# Patient Record
Sex: MALE | Race: WHITE | NOT HISPANIC OR LATINO | Employment: FULL TIME | URBAN - METROPOLITAN AREA
[De-identification: names, ages, dates, MRNs, and addresses within clinical notes are randomized per-mention and may not be internally consistent; named-entity substitution may affect disease eponyms.]

---

## 2017-02-06 ENCOUNTER — ALLSCRIPTS OFFICE VISIT (OUTPATIENT)
Dept: OTHER | Facility: OTHER | Age: 47
End: 2017-02-06

## 2018-01-15 NOTE — PROGRESS NOTES
Assessment    1  Current every day smoker (305 1) (F17 200)   2  Encounter for preventive health examination (V70 0) (Z00 00)   3  Prostate cancer screening (V76 44) (Z12 5)   4  Screening for cardiovascular, respiratory, and genitourinary diseases   (V81 2,V81 4,V81 6) (Z13 6,Z13 83,Z13 89)   5  Screening for diabetes mellitus (DM) (V77 1) (Z13 1)   6  GERD (gastroesophageal reflux disease) (530 81) (K21 9)   7  Mild intermittent asthma (493 90) (J45 20)    Plan  GERD (gastroesophageal reflux disease)    · RaNITidine HCl - 150 MG Oral Tablet; TAKE 1 TABLET TWICE DAILY  prn   · 1 - Linda Varela MD, Liliam Velazuqez (Gastroenterology) Physician Referral  Consult Only: the  expectation is that the referring provider will communicate back to the patient on  treatment options  Evaluation and Treatment: the expectation is that the referred to  provider will communicate back to the patient on treatment options  consider EGD   Status: Active  Requested for: 77OXW3660  Care Summary provided  : Yes  Health Maintenance, GERD (gastroesophageal reflux disease), Prostate cancer  screening, Screening for cardiovascular, respiratory, and genitourinary diseases,  Screening for diabetes mellitus (DM)    · (1) CBC/PLT/DIFF; Status:Active; Requested for:56Rso5334;    · (1) COMPREHENSIVE METABOLIC PANEL; Status:Active; Requested for:40Lum9495;    · (1) LIPID PANEL FASTING W DIRECT LDL REFLEX; Status:Active; Requested  for:34Ksn3860;    · (1) PSA (SCREEN) (Dx V76 44 Screen for Prostate Cancer); Status:Active; Requested  for:61Zoe6826;    · (1) TSH; Status:Active;  Requested for:88Ynl0303;   Mild intermittent asthma    · Ventolin  (90 Base) MCG/ACT Inhalation Aerosol Solution; 2 puffs q4hrs  prn, swish/spit after use  SocHx: Current every day smoker    · Follow Up if Not Better Evaluation and Treatment  Follow-up  Status: Complete  Done:  58JXL2506   · Shared Decision Making Aid given; Status:Complete;   Done: 78NTQ2557   · You need to quit smoking ; Status:Complete;   Done: 25PXK6383    Discussion/Summary    You can be offered an Adacel shot (10 year tetanus plus pertussis) in the future if you have not had one (dTaP)  The patient was counseled regarding risk factor reductions, impressions, risks and benefits of treatment options  Chief Complaint  NP present for CPE  ac/cma      History of Present Illness  HM, Adult Male: The patient is being seen for a health maintenance evaluation  General Health: The patient's health since the last visit is described as good  Immunizations status: not up to date   unknown  Lifestyle:  He consumes a diverse and healthy diet  He does not have any weight concerns  He exercises regularly  He uses tobacco    Screening:   HPI: daily GERD for years, no EGD in past, takes omeprazole    late night meals cut back  lost some wt  gym 3-4x/wk  occas pepcid/zantac  bowels and urine normal  twin children    asthma as child, some season triggers and uri  not persistent  ventolin prn  occas anxiety at work/kids  irritable at times  not ready for meds at this time         Review of Systems    Cardiovascular: no chest pain  Respiratory: wheezing and sometimes as stated above, but no shortness of breath  Psychiatric: anxiety, but no depression  Active Problems    1  Colon cancer screening (V76 51) (Z12 11)   2  Immunization due (V05 9) (Z23)   3  Prostate cancer screening (V76 44) (Z12 5)   4  Screening for cardiovascular, respiratory, and genitourinary diseases   (V81 2,V81 4,V81 6) (Z13 6,Z13 83,Z13 89)   5  Screening for diabetes mellitus (DM) (V77 1) (Z13 1)    Family History  Father    · Family history of diabetes mellitus (V18 0) (Z83 3)    Social History    · Current every day smoker (305 1) (F17 200)   · Occasional alcohol use    Current Meds   1  Omeprazole 20 MG Oral Tablet Delayed Release; TAKE 1 TABLET  AS NEEDED   Recorded    Allergies    1  Amoxicillin TABS   2   Penicillins    Vitals   Recorded: 44FNR1472 04:30PM   Temperature 96 5 F   Heart Rate 64   Respiration 20   Systolic 161   Diastolic 70   Height 5 ft 9 9 in   Weight 207 lb    BMI Calculated 29 78   BSA Calculated 2 12     Physical Exam    Constitutional   General appearance: No acute distress, well appearing and well nourished  Eyes   Conjunctiva and lids: No erythema, swelling or discharge  Pupils and irises: Equal, round, reactive to light  Ears, Nose, Mouth, and Throat   External inspection of ears and nose: Normal     Otoscopic examination: Tympanic membranes translucent with normal light reflex  Canals patent without erythema  Nasal mucosa, septum, and turbinates: Normal without edema or erythema  Lips, teeth, and gums: Normal, good dentition  Oropharynx: Normal with no erythema, edema, exudate or lesions  Neck   Neck: Supple, symmetric, trachea midline, no masses  Thyroid: Normal, no thyromegaly  Pulmonary   Respiratory effort: No increased work of breathing or signs of respiratory distress  Auscultation of lungs: Clear to auscultation  Cardiovascular   Auscultation of heart: Normal rate and rhythm, normal S1 and S2, no murmurs  Carotid pulses: 2+ bilaterally  Pedal pulses: 2+ bilaterally  Examination of extremities for edema and/or varicosities: Normal     Chest   Palpation of breasts and axillae: Normal, no masses palpated  Chest: Normal     Abdomen   Abdomen: Non-tender, no masses  Liver and spleen: No hepatomegaly or splenomegaly  Examination for hernias: No hernias appreciated  Genitourinary   Scrotal contents: Normal testes, no masses  Penis: Normal, no lesions  Digital rectal exam of prostate: Normal size, no masses  Lymphatic   Palpation of lymph nodes in neck: No lymphadenopathy  Palpation of lymph nodes in groin: No lymphadenopathy  Musculoskeletal   Gait and station: Normal     Inspection/palpation of digits and nails: Normal without clubbing or cyanosis  Inspection/palpation of joints, bones, and muscles: Normal     Skin   Skin and subcutaneous tissue: Normal without rashes or lesions  Palpation of skin and subcutaneous tissue: Normal turgor  Neurologic   Reflexes: 2+ and symmetric  Sensation: No sensory loss  Psychiatric   Mood and affect: Normal        Procedure    Procedure: Visual Acuity Test    Indication: routine screening  Inforrmation supplied by a Snellen chart  Results: 20/15 in both eyes with corrective device, 20/20 in the right eye with corrective device, 20/20 in the left eye with corrective device      Provider Comments  Provider Comments:   consider EGD for chronic GERD  Recent data with increased risk of ischemic CVA with PPI use advised    could offer adacel at nurse visit if needed      Signatures   Electronically signed by : Erik Villeda DO; Feb 6 2017  5:25PM EST                       (Author)

## 2018-01-22 VITALS
HEIGHT: 70 IN | TEMPERATURE: 96.5 F | WEIGHT: 207 LBS | DIASTOLIC BLOOD PRESSURE: 70 MMHG | HEART RATE: 64 BPM | RESPIRATION RATE: 20 BRPM | BODY MASS INDEX: 29.63 KG/M2 | SYSTOLIC BLOOD PRESSURE: 110 MMHG

## 2021-05-15 PROBLEM — K21.9 GERD WITHOUT ESOPHAGITIS: Status: ACTIVE | Noted: 2017-02-06

## 2021-05-15 PROBLEM — F17.200 TOBACCO USE DISORDER: Status: ACTIVE | Noted: 2018-07-16

## 2021-05-15 PROBLEM — F41.1 GAD (GENERALIZED ANXIETY DISORDER): Status: ACTIVE | Noted: 2018-07-16

## 2021-05-15 PROBLEM — J45.20 MILD INTERMITTENT ASTHMA: Status: ACTIVE | Noted: 2017-02-06

## 2021-05-15 PROBLEM — E66.9 OBESITY (BMI 30-39.9): Status: ACTIVE | Noted: 2018-03-14

## 2021-05-15 PROBLEM — E78.2 MIXED HYPERLIPIDEMIA: Status: ACTIVE | Noted: 2018-08-27

## 2021-05-15 RX ORDER — ALBUTEROL SULFATE 90 UG/1
AEROSOL, METERED RESPIRATORY (INHALATION)
COMMUNITY
Start: 2017-02-06 | End: 2021-05-18 | Stop reason: SDUPTHER

## 2021-05-18 ENCOUNTER — OFFICE VISIT (OUTPATIENT)
Dept: FAMILY MEDICINE CLINIC | Facility: CLINIC | Age: 51
End: 2021-05-18
Payer: COMMERCIAL

## 2021-05-18 VITALS
RESPIRATION RATE: 16 BRPM | DIASTOLIC BLOOD PRESSURE: 70 MMHG | BODY MASS INDEX: 31.78 KG/M2 | HEIGHT: 70 IN | OXYGEN SATURATION: 94 % | HEART RATE: 96 BPM | SYSTOLIC BLOOD PRESSURE: 138 MMHG | TEMPERATURE: 97.9 F | WEIGHT: 222 LBS

## 2021-05-18 DIAGNOSIS — Z13.83 SCREENING FOR CARDIOVASCULAR, RESPIRATORY, AND GENITOURINARY DISEASES: ICD-10-CM

## 2021-05-18 DIAGNOSIS — F41.1 GAD (GENERALIZED ANXIETY DISORDER): ICD-10-CM

## 2021-05-18 DIAGNOSIS — J45.20 MILD INTERMITTENT ASTHMA, UNSPECIFIED WHETHER COMPLICATED: ICD-10-CM

## 2021-05-18 DIAGNOSIS — Z12.5 PROSTATE CANCER SCREENING: ICD-10-CM

## 2021-05-18 DIAGNOSIS — F17.200 TOBACCO USE DISORDER: ICD-10-CM

## 2021-05-18 DIAGNOSIS — Z13.1 SCREENING FOR DIABETES MELLITUS (DM): ICD-10-CM

## 2021-05-18 DIAGNOSIS — K21.9 GERD WITHOUT ESOPHAGITIS: Primary | ICD-10-CM

## 2021-05-18 DIAGNOSIS — Z13.6 SCREENING FOR CARDIOVASCULAR, RESPIRATORY, AND GENITOURINARY DISEASES: ICD-10-CM

## 2021-05-18 DIAGNOSIS — E78.2 MIXED HYPERLIPIDEMIA: ICD-10-CM

## 2021-05-18 DIAGNOSIS — E66.9 OBESITY (BMI 30-39.9): ICD-10-CM

## 2021-05-18 DIAGNOSIS — Z13.89 SCREENING FOR CARDIOVASCULAR, RESPIRATORY, AND GENITOURINARY DISEASES: ICD-10-CM

## 2021-05-18 DIAGNOSIS — R19.4 BOWEL HABIT CHANGES: ICD-10-CM

## 2021-05-18 PROCEDURE — 99204 OFFICE O/P NEW MOD 45 MIN: CPT | Performed by: FAMILY MEDICINE

## 2021-05-18 RX ORDER — ALBUTEROL SULFATE 90 UG/1
2 AEROSOL, METERED RESPIRATORY (INHALATION) EVERY 6 HOURS PRN
Qty: 18 G | Refills: 3 | Status: SHIPPED | OUTPATIENT
Start: 2021-05-18 | End: 2021-05-18

## 2021-05-18 RX ORDER — BUPROPION HYDROCHLORIDE 150 MG/1
150 TABLET ORAL DAILY
Qty: 90 TABLET | Refills: 1 | Status: SHIPPED | OUTPATIENT
Start: 2021-05-18 | End: 2021-06-22 | Stop reason: SDUPTHER

## 2021-05-18 RX ORDER — ALPRAZOLAM 0.5 MG/1
TABLET ORAL
COMMUNITY
End: 2021-05-18

## 2021-05-18 RX ORDER — ALBUTEROL SULFATE 90 UG/1
AEROSOL, METERED RESPIRATORY (INHALATION)
Qty: 54 G | Refills: 1 | Status: SHIPPED | OUTPATIENT
Start: 2021-05-18

## 2021-05-18 RX ORDER — BUPROPION HYDROCHLORIDE 150 MG/1
150 TABLET ORAL DAILY
COMMUNITY
End: 2021-05-18 | Stop reason: SDUPTHER

## 2021-05-18 NOTE — PROGRESS NOTES
Assessment/Plan:    No problem-specific Assessment & Plan notes found for this encounter  Hx of CPE at [facility] <1y ago    Intermittent asthma, SANIA refilled, f/u if uses frequently    Tobacco use, discouraged, trying patch, cannot use chantix due to mood disorder aware    NIMA, persistent, did well on wellbutrin when taken, will restart, other options if not effective, no hx of SSRI recalled, agrees to avoid BZD, wife agrees    High trig hx  Off meds for a while  Reassess  May be lovaza candidate or low dose statin due to myalgias     Diagnoses and all orders for this visit:    GERD without esophagitis  -     Ambulatory referral to Gastroenterology; Future    Tobacco use disorder    NIMA (generalized anxiety disorder)  -     buPROPion (WELLBUTRIN XL) 150 mg 24 hr tablet; Take 1 tablet (150 mg total) by mouth daily    Mild intermittent asthma, unspecified whether complicated  -     Discontinue: albuterol (Ventolin HFA) 90 mcg/act inhaler; Inhale 2 puffs every 6 (six) hours as needed for wheezing or shortness of breath Rinse/spit after use    Mixed hyperlipidemia    Obesity (BMI 30-39  9)    Bowel habit changes  -     Ambulatory referral to Gastroenterology; Future    Screening for cardiovascular, respiratory, and genitourinary diseases  -     Lipid Panel with Direct LDL reflex; Future  -     Lipid Panel with Direct LDL reflex    Screening for diabetes mellitus (DM)  -     Comprehensive metabolic panel; Future  -     Comprehensive metabolic panel    Prostate cancer screening  -     PSA, Total Screen; Future    Other orders  -     Discontinue: albuterol (Ventolin HFA) 90 mcg/act inhaler; Inhale  -     esomeprazole (NexIUM) 20 mg capsule; Take 20 mg by mouth  -     Discontinue: ALPRAZolam (XANAX) 0 5 mg tablet; Take by mouth daily at bedtime as needed for anxiety  -     Discontinue: buPROPion (WELLBUTRIN XL) 150 mg 24 hr tablet;  Take 150 mg by mouth daily              Return in about 1 month (around 6/18/2021) for Recheck  Subjective:      Patient ID: Radha Bernstein is a 52 y o  male  Chief Complaint   Patient presents with    New Patient Visit     [address]  New patient  Was at [facility]  Dx with NIMA, high chol,   wellbutrin for tob use in past then mood    Ran out of insurance  Still smoking   wellbutrin worked on 150mg qd  No other ssri  Some generic xanax  Felt ok on wellbutrin    Fenofibrate taken  Muscle aches on statin  Tries low fat diet  Not been on meds for a while      Exercises 3-4x/w    bzd risks aware    Last labs 1y ago  No meds in past year    Has gerd  Never had an egd  Frequent symptoms  On otc nexium 20mg/d  Has bowel habit changes also wife reports    Asthma on/off  Since child  Intermittent type    The following portions of the patient's history were reviewed and updated as appropriate: allergies, current medications, past family history, past medical history, past social history, past surgical history and problem list     Review of Systems   Constitutional: Negative for chills and fever  HENT: Negative for sore throat and trouble swallowing  Eyes: Negative for discharge and redness  Respiratory: Negative for shortness of breath  Cardiovascular: Negative for chest pain  Gastrointestinal: Negative for abdominal pain and blood in stool  Genitourinary: Negative for dysuria and hematuria  Musculoskeletal: Negative for arthralgias and myalgias  Skin: Negative for pallor and rash  Neurological: Negative for seizures  Psychiatric/Behavioral: Negative for dysphoric mood  The patient is nervous/anxious            Current Outpatient Medications   Medication Sig Dispense Refill    buPROPion (WELLBUTRIN XL) 150 mg 24 hr tablet Take 1 tablet (150 mg total) by mouth daily 90 tablet 1    esomeprazole (NexIUM) 20 mg capsule Take 20 mg by mouth      albuterol (PROVENTIL HFA,VENTOLIN HFA) 90 mcg/act inhaler INHALE 2 PUFFS BY MOUTH EVERY 6 HOURS AS NEEDED FOR WHEEZING OR SHORTNESS OF BREATH  RINSE AND SPIT AFTER USE 54 g 1     No current facility-administered medications for this visit  Objective:    /70   Pulse 96   Temp 97 9 °F (36 6 °C)   Resp 16   Ht 5' 10" (1 778 m)   Wt 101 kg (222 lb)   SpO2 94%   BMI 31 85 kg/m²        Physical Exam  Vitals signs and nursing note reviewed  Constitutional:       General: He is not in acute distress  Appearance: He is well-developed  He is not ill-appearing  HENT:      Head: Normocephalic  Right Ear: Tympanic membrane and external ear normal  There is no impacted cerumen  Left Ear: Tympanic membrane and external ear normal  There is no impacted cerumen  Nose: No congestion  Mouth/Throat:      Mouth: Mucous membranes are moist       Pharynx: Oropharynx is clear  No oropharyngeal exudate  Eyes:      General: No scleral icterus  Conjunctiva/sclera: Conjunctivae normal    Neck:      Musculoskeletal: Neck supple  Cardiovascular:      Rate and Rhythm: Normal rate and regular rhythm  Heart sounds: No murmur  Pulmonary:      Effort: Pulmonary effort is normal  No respiratory distress  Breath sounds: No wheezing  Abdominal:      General: There is no distension  Palpations: Abdomen is soft  There is no mass  Tenderness: There is no abdominal tenderness  Hernia: A hernia is present  Hernia is present in the umbilical area  Genitourinary:     Penis: Normal        Scrotum/Testes: Normal       Prostate: Normal       Rectum: Normal    Musculoskeletal:         General: No deformity  Right lower leg: No edema  Left lower leg: No edema  Skin:     General: Skin is warm and dry  Coloration: Skin is not jaundiced or pale  Neurological:      Mental Status: He is alert  Motor: No weakness  Gait: Gait normal    Psychiatric:         Behavior: Behavior normal          Thought Content: Thought content normal        BMI Counseling:  Body mass index is 31 85 kg/m²  The BMI is above normal  Nutrition recommendations include decreasing portion sizes and moderation in carbohydrate intake  Exercise recommendations include exercising 3-5 times per week  No pharmacotherapy was ordered         bmi not accurate due to muscle mass         Gonzalo Sherwood, DO

## 2021-06-22 ENCOUNTER — OFFICE VISIT (OUTPATIENT)
Dept: FAMILY MEDICINE CLINIC | Facility: CLINIC | Age: 51
End: 2021-06-22
Payer: COMMERCIAL

## 2021-06-22 VITALS
RESPIRATION RATE: 16 BRPM | SYSTOLIC BLOOD PRESSURE: 134 MMHG | WEIGHT: 225 LBS | TEMPERATURE: 97.6 F | BODY MASS INDEX: 32.21 KG/M2 | HEIGHT: 70 IN | HEART RATE: 88 BPM | DIASTOLIC BLOOD PRESSURE: 70 MMHG

## 2021-06-22 DIAGNOSIS — F17.200 TOBACCO USE DISORDER: ICD-10-CM

## 2021-06-22 DIAGNOSIS — E78.2 MIXED HYPERLIPIDEMIA: ICD-10-CM

## 2021-06-22 DIAGNOSIS — F41.1 GAD (GENERALIZED ANXIETY DISORDER): Primary | ICD-10-CM

## 2021-06-22 PROCEDURE — 99214 OFFICE O/P EST MOD 30 MIN: CPT | Performed by: FAMILY MEDICINE

## 2021-06-22 RX ORDER — BUPROPION HYDROCHLORIDE 300 MG/1
300 TABLET ORAL DAILY
Qty: 90 TABLET | Refills: 1 | Status: SHIPPED | OUTPATIENT
Start: 2021-06-22 | End: 2021-12-18

## 2021-06-22 NOTE — PATIENT INSTRUCTIONS
If your triglycerides are over 500, then low fat diet is recommended and we can initiate a medication called Lovaza or Vascepa and monitor you every 6 months

## 2021-06-22 NOTE — PROGRESS NOTES
Assessment/Plan:    No problem-specific Assessment & Plan notes found for this encounter  NIMA better but not fully, increase wellbutrin to 300mg and f/u in 1m if no better but q6m if finds effective    tob use discouraged, try to stop while on wellbutrin    Lipids discussed, try low fat diet    bmi aware, diet suggested and exercise    If trig over 500, discussed starting lovaza or vascepa then f/u afterwards     Diagnoses and all orders for this visit:    NIMA (generalized anxiety disorder)  -     buPROPion (WELLBUTRIN XL) 300 mg 24 hr tablet; Take 1 tablet (300 mg total) by mouth daily    Tobacco use disorder    Mixed hyperlipidemia        Return in about 6 months (around 12/22/2021) for Recheck  Subjective:      Patient ID: Jose May is a 54 y o  male  Chief Complaint   Patient presents with    Follow-up     [facility] lpn       HPI   in [address] [place] [address] smoking about 1/2 ppd, less than before, less am craving    Stress smoker  No time    Did not do labs yet  No probs sleeping    Willing to use higher dose    The following portions of the patient's history were reviewed and updated as appropriate: allergies, current medications, past family history, past medical history, past social history, past surgical history and problem list     Review of Systems   Respiratory: Negative for shortness of breath  Neurological: Negative for seizures  Current Outpatient Medications   Medication Sig Dispense Refill    albuterol (PROVENTIL HFA,VENTOLIN HFA) 90 mcg/act inhaler INHALE 2 PUFFS BY MOUTH EVERY 6 HOURS AS NEEDED FOR WHEEZING OR SHORTNESS OF BREATH  RINSE AND SPIT AFTER USE 54 g 1    buPROPion (WELLBUTRIN XL) 300 mg 24 hr tablet Take 1 tablet (300 mg total) by mouth daily 90 tablet 1    esomeprazole (NexIUM) 20 mg capsule Take 20 mg by mouth       No current facility-administered medications for this visit         Objective:    /70   Pulse 88   Temp 97 6 °F (36 4 °C)   Resp 16  5' 10" (1 778 m)   Wt 102 kg (225 lb)   BMI 32 28 kg/m²        Physical Exam  Vitals and nursing note reviewed  Constitutional:       Appearance: He is well-developed  He is obese  He is not diaphoretic  HENT:      Head: Normocephalic  Right Ear: Tympanic membrane normal       Left Ear: Tympanic membrane normal    Eyes:      General: No scleral icterus  Conjunctiva/sclera: Conjunctivae normal    Cardiovascular:      Rate and Rhythm: Normal rate and regular rhythm  Heart sounds: No murmur heard  Pulmonary:      Effort: Pulmonary effort is normal  No respiratory distress  Abdominal:      General: There is no distension  Palpations: Abdomen is soft  Tenderness: There is no abdominal tenderness  Musculoskeletal:         General: No deformity  Cervical back: Neck supple  Skin:     General: Skin is warm and dry  Coloration: Skin is not pale  Neurological:      Mental Status: He is alert  Gait: Gait normal    Psychiatric:         Behavior: Behavior normal          Thought Content:  Thought content normal                 Diana Stein DO

## 2021-07-13 LAB
ALBUMIN SERPL-MCNC: 4.2 G/DL (ref 4–5)
ALBUMIN/GLOB SERPL: 1.4 {RATIO} (ref 1.2–2.2)
ALP SERPL-CCNC: 80 IU/L (ref 48–121)
ALT SERPL-CCNC: 18 IU/L (ref 0–44)
AST SERPL-CCNC: 22 IU/L (ref 0–40)
BILIRUB SERPL-MCNC: 0.2 MG/DL (ref 0–1.2)
BUN SERPL-MCNC: 14 MG/DL (ref 6–24)
BUN/CREAT SERPL: 14 (ref 9–20)
CALCIUM SERPL-MCNC: 9.4 MG/DL (ref 8.7–10.2)
CHLORIDE SERPL-SCNC: 102 MMOL/L (ref 96–106)
CHOLEST SERPL-MCNC: 230 MG/DL (ref 100–199)
CO2 SERPL-SCNC: 23 MMOL/L (ref 20–29)
CREAT SERPL-MCNC: 0.97 MG/DL (ref 0.76–1.27)
GLOBULIN SER-MCNC: 3.1 G/DL (ref 1.5–4.5)
GLUCOSE SERPL-MCNC: 110 MG/DL (ref 65–99)
HDLC SERPL-MCNC: 33 MG/DL
LDLC SERPL CALC-MCNC: 108 MG/DL (ref 0–99)
LDLC/HDLC SERPL: 3.3 RATIO (ref 0–3.6)
MICRODELETION SYND BLD/T FISH: NORMAL
POTASSIUM SERPL-SCNC: 4.2 MMOL/L (ref 3.5–5.2)
PROT SERPL-MCNC: 7.3 G/DL (ref 6–8.5)
PSA SERPL-MCNC: 1 NG/ML (ref 0–4)
SL AMB EGFR AFRICAN AMERICAN: 106 ML/MIN/1.73
SL AMB EGFR NON AFRICAN AMERICAN: 91 ML/MIN/1.73
SL AMB VLDL CHOLESTEROL CALC: 89 MG/DL (ref 5–40)
SODIUM SERPL-SCNC: 138 MMOL/L (ref 134–144)
TRIGL SERPL-MCNC: 513 MG/DL (ref 0–149)

## 2021-07-15 ENCOUNTER — OFFICE VISIT (OUTPATIENT)
Dept: GASTROENTEROLOGY | Facility: CLINIC | Age: 51
End: 2021-07-15
Payer: COMMERCIAL

## 2021-07-15 VITALS
HEIGHT: 70 IN | DIASTOLIC BLOOD PRESSURE: 93 MMHG | SYSTOLIC BLOOD PRESSURE: 167 MMHG | HEART RATE: 75 BPM | WEIGHT: 226.2 LBS | BODY MASS INDEX: 32.38 KG/M2

## 2021-07-15 DIAGNOSIS — K21.9 GERD WITHOUT ESOPHAGITIS: ICD-10-CM

## 2021-07-15 DIAGNOSIS — R19.7 DIARRHEA, UNSPECIFIED TYPE: Primary | ICD-10-CM

## 2021-07-15 DIAGNOSIS — R19.4 BOWEL HABIT CHANGES: ICD-10-CM

## 2021-07-15 PROCEDURE — 99204 OFFICE O/P NEW MOD 45 MIN: CPT | Performed by: INTERNAL MEDICINE

## 2021-07-15 NOTE — PROGRESS NOTES
[facility] - Outpatient Consultation  Debra Gaspar 54 y o  male MRN: [id number]  Encounter: [id number]          ASSESSMENT AND PLAN:      1  GERD without esophagitis   patient has history of epigastric discomfort and heartburn  He has been taking over-the-counter omeprazole 20 mg a day  He that relieves his symptoms pretty much  Recently he has noted food getting up into his throat  He also is having epigastric discomfort and pain after eating  His eating habits are very erratic  He has a shaft  He eats late at night  He goes to bed  He has had occasions when he has woken up with acid and food in his mouth  He denies any dysphagia or odynophagia  He denies any weight loss or weight gain  There is no nausea or vomiting  He denies any other systemic symptoms at this time  - Ambulatory referral to Gastroenterology  - EGD; Future    2  Bowel habit changes    Patient is complaining of change in his bowel habits loose stools  He has not seen any blood in it  There is no family history of colon cancer  He does not have any other systemic symptoms associated  With this change in bowel habits  There is no family history of colon cancer  A colonoscopy will be scheduled for evaluation  He never had a screening colonoscopy  - Ambulatory referral to Gastroenterology  - Colonoscopy; Future    3  Diarrhea, unspecified type    See above  There has been no rectal bleeding or mucus per rectum  - Colonoscopy; Future    ______________________________________________________________________    HPI:    Epigastric pain and discomfort  Reflux  Heartburn  Regurgitation of food and acid into the throat  Change in bowel habits with diarrhea  There is no rectal bleeding  REVIEW OF SYSTEMS:    CONSTITUTIONAL: Denies any fever, chills, rigors, and weight loss  HEENT: No earache or tinnitus  Denies hearing loss or visual disturbances    CARDIOVASCULAR: No chest pain or palpitations  RESPIRATORY: Denies any cough, hemoptysis, shortness of breath or dyspnea on exertion  GASTROINTESTINAL: As noted in the History of Present Illness  GENITOURINARY: No problems with urination  Denies any hematuria or dysuria  NEUROLOGIC: No dizziness or vertigo, denies headaches  MUSCULOSKELETAL: Denies any muscle or joint pain  SKIN: Denies skin rashes or itching  ENDOCRINE: Denies excessive thirst  Denies intolerance to heat or cold  PSYCHOSOCIAL: Denies depression or anxiety  Denies any recent memory loss  Historical Information   History reviewed  No pertinent past medical history  History reviewed  No pertinent surgical history  Social History   Social History     Substance and Sexual Activity   Alcohol Use Not Currently     Social History     Substance and Sexual Activity   Drug Use Never     Social History     Tobacco Use   Smoking Status Current Every Day Smoker    Packs/day: 0 25    Types: Cigarettes   Smokeless Tobacco Never Used     History reviewed  No pertinent family history  Meds/Allergies       Current Outpatient Medications:     albuterol (PROVENTIL HFA,VENTOLIN HFA) 90 mcg/act inhaler    buPROPion (WELLBUTRIN XL) 300 mg 24 hr tablet    esomeprazole (NexIUM) 20 mg capsule    Allergies   Allergen Reactions    Penicillins GI Intolerance and Seizures           Objective     Blood pressure 167/93, pulse 75, height 5' 10" (1 778 m), weight 103 kg (226 lb 3 2 oz)  Body mass index is 32 46 kg/m²  PHYSICAL EXAM:      General Appearance:   Alert, cooperative, no distress   HEENT:   Normocephalic, atraumatic, anicteric      Neck:  Supple, symmetrical, trachea midline   Lungs:   Clear to auscultation bilaterally; no rales, rhonchi or wheezing; respirations unlabored    Heart[de-identified]   Regular rate and rhythm; no murmur, rub, or gallop     Abdomen:   Soft, non-tender, non-distended; normal bowel sounds; no masses, no organomegaly   Small umbilical hernia noted Genitalia:   Deferred    Rectal:   Deferred    Extremities:  No cyanosis, clubbing or edema    Pulses:  2+ and symmetric    Skin:  No jaundice, rashes, or lesions    Lymph nodes:  No palpable cervical lymphadenopathy        Lab Results:   No visits with results within 1 Day(s) from this visit  Latest known visit with results is:   Office Visit on 05/18/2021   Component Date Value    Glucose, Random 07/12/2021 110*    BUN 07/12/2021 14     Creatinine 07/12/2021 0 97     eGFR Non  07/12/2021 91     eGFR  07/12/2021 106     SL AMB BUN/CREATININE RA* 07/12/2021 14     Sodium 07/12/2021 138     Potassium 07/12/2021 4 2     Chloride 07/12/2021 102     CO2 07/12/2021 23     CALCIUM 07/12/2021 9 4     Protein, Total 07/12/2021 7 3     Albumin 07/12/2021 4 2     Globulin, Total 07/12/2021 3 1     Albumin/Globulin Ratio 07/12/2021 1 4     TOTAL BILIRUBIN 07/12/2021 0 2     Alk Phos Isoenzymes 07/12/2021 80     AST 07/12/2021 22     ALT 07/12/2021 18     Cholesterol, Total 07/12/2021 230*    Triglycerides 07/12/2021 513*    HDL 07/12/2021 33*    VLDL Cholesterol Calcula* 07/12/2021 89*    LDL Calculated 07/12/2021 108*    LDl/HDL Ratio 07/12/2021 3 3     Prostate Specific Antige* 07/12/2021 1 0     Interpretation 07/12/2021 Note          Radiology Results:   No results found

## 2021-07-20 ENCOUNTER — TELEPHONE (OUTPATIENT)
Dept: FAMILY MEDICINE CLINIC | Facility: CLINIC | Age: 51
End: 2021-07-20

## 2021-07-20 DIAGNOSIS — E78.1 HIGH TRIGLYCERIDES: Primary | ICD-10-CM

## 2021-07-20 DIAGNOSIS — E78.1 HIGH TRIGLYCERIDES: ICD-10-CM

## 2021-07-20 RX ORDER — OMEGA-3-ACID ETHYL ESTERS 1 G/1
2 CAPSULE, LIQUID FILLED ORAL 2 TIMES DAILY
Qty: 120 CAPSULE | Refills: 5 | Status: SHIPPED | OUTPATIENT
Start: 2021-07-20 | End: 2021-07-21

## 2021-07-20 NOTE — TELEPHONE ENCOUNTER
Patient called and stated that at his last office visit he and Dr Herber Carnes discussed a FishOil to take if his triglycerides showed that they were high in his blood work  He states that they were high    Please advise       Mike Moritz [place]

## 2021-07-21 RX ORDER — OMEGA-3-ACID ETHYL ESTERS 1 G/1
CAPSULE, LIQUID FILLED ORAL
Qty: 360 CAPSULE | Refills: 1 | Status: SHIPPED | OUTPATIENT
Start: 2021-07-21 | End: 2022-03-30

## 2021-07-21 NOTE — TELEPHONE ENCOUNTER
Please let him know I sent a prescription and he should follow up in office if he has any problems with it before his next scheduled visit in December

## 2021-08-18 ENCOUNTER — TELEPHONE (OUTPATIENT)
Dept: PREADMISSION TESTING | Facility: HOSPITAL | Age: 51
End: 2021-08-18

## 2021-08-18 VITALS — BODY MASS INDEX: 32.35 KG/M2 | WEIGHT: 226 LBS | HEIGHT: 70 IN

## 2021-08-18 NOTE — PRE-PROCEDURE INSTRUCTIONS
Pre-Surgery Instructions:   Medication Instructions    albuterol (PROVENTIL HFA,VENTOLIN HFA) 90 mcg/act inhaler Pt may use if needed    bisacodyl (DULCOLAX) 5 mg EC tablet Patient was instructed by Physician and understands   buPROPion (WELLBUTRIN XL) 300 mg 24 hr tablet Instructed patient per Anesthesia Guidelines   esomeprazole (NexIUM) 20 mg capsule Instructed patient per Anesthesia Guidelines   magnesium citrate solution Patient was instructed by Physician and understands   omega-3-acid ethyl esters (LOVAZA) 1 g capsule Patient was instructed by Physician and understands   Polyethylene Glycol 3350 (MIRALAX PO) Patient was instructed by Physician and understands  Pt to follow Dr Castro Long instructions  Pt to have covid screening on 8/19/21 at the [facility] [employer] [phone]

## 2021-08-23 ENCOUNTER — TELEPHONE (OUTPATIENT)
Dept: GASTROENTEROLOGY | Facility: CLINIC | Age: 51
End: 2021-08-23

## 2021-10-27 ENCOUNTER — TELEPHONE (OUTPATIENT)
Dept: PREADMISSION TESTING | Facility: HOSPITAL | Age: 51
End: 2021-10-27

## 2021-10-27 RX ORDER — OMEPRAZOLE 20 MG/1
20 CAPSULE, DELAYED RELEASE ORAL DAILY
COMMUNITY

## 2021-11-02 ENCOUNTER — PREP FOR PROCEDURE (OUTPATIENT)
Dept: GASTROENTEROLOGY | Facility: CLINIC | Age: 51
End: 2021-11-02

## 2021-11-02 DIAGNOSIS — R19.4 BOWEL HABIT CHANGES: Primary | ICD-10-CM

## 2021-11-02 DIAGNOSIS — K21.9 GASTROESOPHAGEAL REFLUX DISEASE WITHOUT ESOPHAGITIS: ICD-10-CM

## 2021-11-02 DIAGNOSIS — R19.7 DIARRHEA, UNSPECIFIED TYPE: ICD-10-CM

## 2021-12-10 DIAGNOSIS — F41.1 GAD (GENERALIZED ANXIETY DISORDER): ICD-10-CM

## 2021-12-10 RX ORDER — BUPROPION HYDROCHLORIDE 300 MG/1
300 TABLET ORAL EVERY MORNING
Qty: 30 TABLET | Refills: 0 | Status: SHIPPED | OUTPATIENT
Start: 2021-12-10 | End: 2021-12-20 | Stop reason: SDUPTHER

## 2021-12-13 ENCOUNTER — RA CDI HCC (OUTPATIENT)
Dept: OTHER | Facility: HOSPITAL | Age: 51
End: 2021-12-13

## 2021-12-20 ENCOUNTER — OFFICE VISIT (OUTPATIENT)
Dept: FAMILY MEDICINE CLINIC | Facility: CLINIC | Age: 51
End: 2021-12-20
Payer: COMMERCIAL

## 2021-12-20 VITALS
HEIGHT: 70 IN | BODY MASS INDEX: 34.19 KG/M2 | DIASTOLIC BLOOD PRESSURE: 78 MMHG | HEART RATE: 88 BPM | RESPIRATION RATE: 17 BRPM | WEIGHT: 238.8 LBS | TEMPERATURE: 98.2 F | SYSTOLIC BLOOD PRESSURE: 127 MMHG

## 2021-12-20 DIAGNOSIS — Z13.6 SCREENING FOR CARDIOVASCULAR, RESPIRATORY, AND GENITOURINARY DISEASES: ICD-10-CM

## 2021-12-20 DIAGNOSIS — F41.1 GAD (GENERALIZED ANXIETY DISORDER): ICD-10-CM

## 2021-12-20 DIAGNOSIS — Z12.5 PROSTATE CANCER SCREENING: ICD-10-CM

## 2021-12-20 DIAGNOSIS — Z13.83 SCREENING FOR CARDIOVASCULAR, RESPIRATORY, AND GENITOURINARY DISEASES: ICD-10-CM

## 2021-12-20 DIAGNOSIS — Z23 IMMUNIZATION DUE: ICD-10-CM

## 2021-12-20 DIAGNOSIS — Z13.1 SCREENING FOR DIABETES MELLITUS (DM): ICD-10-CM

## 2021-12-20 DIAGNOSIS — Z13.89 SCREENING FOR CARDIOVASCULAR, RESPIRATORY, AND GENITOURINARY DISEASES: ICD-10-CM

## 2021-12-20 DIAGNOSIS — E66.9 OBESITY (BMI 30-39.9): Primary | ICD-10-CM

## 2021-12-20 DIAGNOSIS — R19.4 BOWEL HABIT CHANGES: ICD-10-CM

## 2021-12-20 DIAGNOSIS — F17.200 TOBACCO USE DISORDER: ICD-10-CM

## 2021-12-20 DIAGNOSIS — E78.2 MIXED HYPERLIPIDEMIA: ICD-10-CM

## 2021-12-20 PROCEDURE — 99214 OFFICE O/P EST MOD 30 MIN: CPT | Performed by: FAMILY MEDICINE

## 2021-12-20 PROCEDURE — 3725F SCREEN DEPRESSION PERFORMED: CPT | Performed by: FAMILY MEDICINE

## 2021-12-20 PROCEDURE — 90471 IMMUNIZATION ADMIN: CPT

## 2021-12-20 PROCEDURE — 90686 IIV4 VACC NO PRSV 0.5 ML IM: CPT

## 2021-12-20 PROCEDURE — 3008F BODY MASS INDEX DOCD: CPT | Performed by: FAMILY MEDICINE

## 2021-12-20 RX ORDER — BUPROPION HYDROCHLORIDE 300 MG/1
300 TABLET ORAL EVERY MORNING
Qty: 90 TABLET | Refills: 0 | Status: SHIPPED | OUTPATIENT
Start: 2021-12-20 | End: 2022-02-02 | Stop reason: ALTCHOICE

## 2021-12-20 RX ORDER — NALTREXONE HYDROCHLORIDE AND BUPROPION HYDROCHLORIDE 8; 90 MG/1; MG/1
2 TABLET, EXTENDED RELEASE ORAL 2 TIMES DAILY
Qty: 120 TABLET | Refills: 1 | Status: SHIPPED | OUTPATIENT
Start: 2021-12-20 | End: 2022-03-02 | Stop reason: SDUPTHER

## 2021-12-20 RX ORDER — NALTREXONE HYDROCHLORIDE AND BUPROPION HYDROCHLORIDE 8; 90 MG/1; MG/1
TABLET, EXTENDED RELEASE ORAL
Qty: 70 TABLET | Refills: 0 | Status: SHIPPED | OUTPATIENT
Start: 2021-12-20 | End: 2022-01-26

## 2021-12-22 LAB
ALBUMIN SERPL-MCNC: 4 G/DL (ref 4–5)
ALBUMIN/GLOB SERPL: 1.4 {RATIO} (ref 1.2–2.2)
ALP SERPL-CCNC: 71 IU/L (ref 44–121)
ALT SERPL-CCNC: 19 IU/L (ref 0–44)
AST SERPL-CCNC: 22 IU/L (ref 0–40)
BILIRUB SERPL-MCNC: <0.2 MG/DL (ref 0–1.2)
BUN SERPL-MCNC: 8 MG/DL (ref 6–24)
BUN/CREAT SERPL: 10 (ref 9–20)
CALCIUM SERPL-MCNC: 9.1 MG/DL (ref 8.7–10.2)
CHLORIDE SERPL-SCNC: 102 MMOL/L (ref 96–106)
CHOLEST SERPL-MCNC: 185 MG/DL (ref 100–199)
CO2 SERPL-SCNC: 21 MMOL/L (ref 20–29)
CREAT SERPL-MCNC: 0.82 MG/DL (ref 0.76–1.27)
ENDOMYSIUM IGA SER QL: NEGATIVE
GLOBULIN SER-MCNC: 2.8 G/DL (ref 1.5–4.5)
GLUCOSE SERPL-MCNC: 106 MG/DL (ref 65–99)
HDLC SERPL-MCNC: 30 MG/DL
IGA SERPL-MCNC: 219 MG/DL (ref 90–386)
LDLC SERPL CALC-MCNC: 97 MG/DL (ref 0–99)
MICRODELETION SYND BLD/T FISH: NORMAL
POTASSIUM SERPL-SCNC: 4.7 MMOL/L (ref 3.5–5.2)
PROT SERPL-MCNC: 6.8 G/DL (ref 6–8.5)
SL AMB EGFR AFRICAN AMERICAN: 120 ML/MIN/1.73
SL AMB EGFR NON AFRICAN AMERICAN: 104 ML/MIN/1.73
SL AMB VLDL CHOLESTEROL CALC: 58 MG/DL (ref 5–40)
SODIUM SERPL-SCNC: 136 MMOL/L (ref 134–144)
TRIGL SERPL-MCNC: 347 MG/DL (ref 0–149)
TTG IGA SER-ACNC: <2 U/ML (ref 0–3)

## 2022-01-21 ENCOUNTER — TELEPHONE (OUTPATIENT)
Dept: PREADMISSION TESTING | Facility: HOSPITAL | Age: 52
End: 2022-01-21

## 2022-01-26 ENCOUNTER — TELEPHONE (OUTPATIENT)
Dept: FAMILY MEDICINE CLINIC | Facility: CLINIC | Age: 52
End: 2022-01-26

## 2022-01-26 DIAGNOSIS — E66.9 OBESITY (BMI 30-39.9): ICD-10-CM

## 2022-01-26 RX ORDER — NALTREXONE HYDROCHLORIDE AND BUPROPION HYDROCHLORIDE 8; 90 MG/1; MG/1
TABLET, EXTENDED RELEASE ORAL
Qty: 70 TABLET | Refills: 0 | Status: SHIPPED | OUTPATIENT
Start: 2022-01-26 | End: 2022-03-02

## 2022-01-26 NOTE — TELEPHONE ENCOUNTER
Naltrexone-buPROPion HCl ER (Contrave) 8-90 MG TB12 [[id number]]       Patient stating he never received his contrave  Can we please resend or call mail order  Looks like it was canceled and sent

## 2022-01-26 NOTE — PRE-PROCEDURE INSTRUCTIONS
Pre-Surgery Instructions:   Medication Instructions    albuterol (PROVENTIL HFA,VENTOLIN HFA) 90 mcg/act inhaler Patient was instructed by Physician and understands   buPROPion (Wellbutrin XL) 300 mg 24 hr tablet Patient was instructed by Physician and understands   Naltrexone-buPROPion HCl ER (Contrave) 8-90 MG TB12 Patient was instructed by Physician and understands   omega-3-acid ethyl esters (LOVAZA) 1 g capsule Patient was instructed by Physician and understands   omeprazole (PriLOSEC) 20 mg delayed release capsule Patient was instructed by Physician and understands  LD omega-3,  01/26/22

## 2022-01-26 NOTE — TELEPHONE ENCOUNTER
Can you please resend  I called the pharmacy to confirm that pt did not pick this up  They stated that it was sent but then cancelled   Alisa Irby LPN

## 2022-02-02 ENCOUNTER — ANESTHESIA EVENT (OUTPATIENT)
Dept: GASTROENTEROLOGY | Facility: AMBULARY SURGERY CENTER | Age: 52
End: 2022-02-02

## 2022-02-02 ENCOUNTER — HOSPITAL ENCOUNTER (OUTPATIENT)
Dept: GASTROENTEROLOGY | Facility: AMBULARY SURGERY CENTER | Age: 52
Setting detail: OUTPATIENT SURGERY
Discharge: HOME | End: 2022-02-02
Attending: INTERNAL MEDICINE | Admitting: INTERNAL MEDICINE
Payer: COMMERCIAL

## 2022-02-02 ENCOUNTER — ANESTHESIA (OUTPATIENT)
Dept: GASTROENTEROLOGY | Facility: AMBULARY SURGERY CENTER | Age: 52
End: 2022-02-02

## 2022-02-02 VITALS
SYSTOLIC BLOOD PRESSURE: 151 MMHG | HEART RATE: 78 BPM | OXYGEN SATURATION: 98 % | TEMPERATURE: 97.2 F | RESPIRATION RATE: 16 BRPM | BODY MASS INDEX: 33.6 KG/M2 | HEIGHT: 71 IN | DIASTOLIC BLOOD PRESSURE: 98 MMHG | WEIGHT: 240 LBS

## 2022-02-02 DIAGNOSIS — R19.4 BOWEL HABIT CHANGES: ICD-10-CM

## 2022-02-02 DIAGNOSIS — R19.7 DIARRHEA, UNSPECIFIED TYPE: ICD-10-CM

## 2022-02-02 DIAGNOSIS — K21.9 GASTROESOPHAGEAL REFLUX DISEASE WITHOUT ESOPHAGITIS: ICD-10-CM

## 2022-02-02 PROCEDURE — 88305 TISSUE EXAM BY PATHOLOGIST: CPT | Performed by: PATHOLOGY

## 2022-02-02 PROCEDURE — 43239 EGD BIOPSY SINGLE/MULTIPLE: CPT | Performed by: INTERNAL MEDICINE

## 2022-02-02 PROCEDURE — 45385 COLONOSCOPY W/LESION REMOVAL: CPT | Performed by: INTERNAL MEDICINE

## 2022-02-02 RX ORDER — FENTANYL CITRATE 50 UG/ML
INJECTION, SOLUTION INTRAMUSCULAR; INTRAVENOUS AS NEEDED
Status: DISCONTINUED | OUTPATIENT
Start: 2022-02-02 | End: 2022-02-02

## 2022-02-02 RX ORDER — LIDOCAINE HYDROCHLORIDE 10 MG/ML
INJECTION, SOLUTION EPIDURAL; INFILTRATION; INTRACAUDAL; PERINEURAL AS NEEDED
Status: DISCONTINUED | OUTPATIENT
Start: 2022-02-02 | End: 2022-02-02

## 2022-02-02 RX ORDER — PROPOFOL 10 MG/ML
INJECTION, EMULSION INTRAVENOUS CONTINUOUS PRN
Status: DISCONTINUED | OUTPATIENT
Start: 2022-02-02 | End: 2022-02-02

## 2022-02-02 RX ORDER — PROPOFOL 10 MG/ML
INJECTION, EMULSION INTRAVENOUS AS NEEDED
Status: DISCONTINUED | OUTPATIENT
Start: 2022-02-02 | End: 2022-02-02

## 2022-02-02 RX ORDER — GLYCOPYRROLATE 0.2 MG/ML
INJECTION INTRAMUSCULAR; INTRAVENOUS AS NEEDED
Status: DISCONTINUED | OUTPATIENT
Start: 2022-02-02 | End: 2022-02-02

## 2022-02-02 RX ORDER — ONDANSETRON 2 MG/ML
4 INJECTION INTRAMUSCULAR; INTRAVENOUS ONCE AS NEEDED
Status: CANCELLED | OUTPATIENT
Start: 2022-02-02

## 2022-02-02 RX ORDER — SODIUM CHLORIDE, SODIUM LACTATE, POTASSIUM CHLORIDE, CALCIUM CHLORIDE 600; 310; 30; 20 MG/100ML; MG/100ML; MG/100ML; MG/100ML
125 INJECTION, SOLUTION INTRAVENOUS CONTINUOUS
Status: DISCONTINUED | OUTPATIENT
Start: 2022-02-02 | End: 2022-02-06 | Stop reason: HOSPADM

## 2022-02-02 RX ORDER — SODIUM CHLORIDE, SODIUM LACTATE, POTASSIUM CHLORIDE, CALCIUM CHLORIDE 600; 310; 30; 20 MG/100ML; MG/100ML; MG/100ML; MG/100ML
INJECTION, SOLUTION INTRAVENOUS CONTINUOUS PRN
Status: DISCONTINUED | OUTPATIENT
Start: 2022-02-02 | End: 2022-02-02

## 2022-02-02 RX ADMIN — FENTANYL CITRATE 75 MCG: 50 INJECTION, SOLUTION INTRAMUSCULAR; INTRAVENOUS at 12:59

## 2022-02-02 RX ADMIN — SODIUM CHLORIDE, SODIUM LACTATE, POTASSIUM CHLORIDE, AND CALCIUM CHLORIDE 125 ML/HR: .6; .31; .03; .02 INJECTION, SOLUTION INTRAVENOUS at 12:29

## 2022-02-02 RX ADMIN — PROPOFOL 120 MCG/KG/MIN: 10 INJECTION, EMULSION INTRAVENOUS at 13:02

## 2022-02-02 RX ADMIN — FENTANYL CITRATE 25 MCG: 50 INJECTION, SOLUTION INTRAMUSCULAR; INTRAVENOUS at 13:02

## 2022-02-02 RX ADMIN — PROPOFOL 100 MG: 10 INJECTION, EMULSION INTRAVENOUS at 13:02

## 2022-02-02 RX ADMIN — GLYCOPYRROLATE 0.2 MG: 0.2 INJECTION, SOLUTION INTRAMUSCULAR; INTRAVENOUS at 12:59

## 2022-02-02 RX ADMIN — LIDOCAINE HYDROCHLORIDE 50 MG: 10 INJECTION, SOLUTION EPIDURAL; INFILTRATION; INTRACAUDAL; PERINEURAL at 13:02

## 2022-02-02 RX ADMIN — SODIUM CHLORIDE, SODIUM LACTATE, POTASSIUM CHLORIDE, AND CALCIUM CHLORIDE: .6; .31; .03; .02 INJECTION, SOLUTION INTRAVENOUS at 12:57

## 2022-02-02 RX ADMIN — PROPOFOL 100 MG: 10 INJECTION, EMULSION INTRAVENOUS at 13:06

## 2022-02-02 NOTE — INTERVAL H&P NOTE
H&P reviewed  After examining the patient I find no changes in the patients condition since the H&P had been written      Vitals:    02/02/22 1206   BP: 154/85   Pulse: 79   Resp: 18   Temp: (!) 97 2 °F (36 2 °C)   SpO2: 95%

## 2022-02-02 NOTE — ANESTHESIA PREPROCEDURE EVALUATION
Procedure:  COLONOSCOPY  EGD    Relevant Problems   ANESTHESIA (within normal limits)      CARDIO   (+) High triglycerides   (+) Mixed hyperlipidemia      GI/HEPATIC   (+) GERD without esophagitis      NEURO/PSYCH   (+) NIMA (generalized anxiety disorder)      PULMONARY   (+) Mild intermittent asthma        Physical Exam    Airway    Mallampati score: II  TM Distance: >3 FB  Neck ROM: full     Dental   No notable dental hx     Cardiovascular  Rhythm: regular, Rate: normal,     Pulmonary  Breath sounds clear to auscultation,     Other Findings        Anesthesia Plan  ASA Score- 2     Anesthesia Type- IV sedation with anesthesia with ASA Monitors  Additional Monitors:   Airway Plan:           Plan Factors-Exercise tolerance (METS): >4 METS  Chart reviewed  EKG reviewed  Existing labs reviewed  Patient summary reviewed  Patient is not a current smoker  Induction-     Postoperative Plan-     Informed Consent- Anesthetic plan and risks discussed with patient  I personally reviewed this patient with the CRNA  Discussed and agreed on the Anesthesia Plan with the CRNA  Dara Soulier

## 2022-02-02 NOTE — ANESTHESIA POSTPROCEDURE EVALUATION
Post-Op Assessment Note    CV Status:  Stable  Pain Score: 0    Pain management: adequate     Mental Status:  Alert and awake   Hydration Status:  Euvolemic   PONV Controlled:  Controlled   Airway Patency:  Patent      Post Op Vitals Reviewed: Yes      Staff: CRNA         No complications documented      BP   132/63   Temp     Pulse 94   Resp 12   SpO2 98%

## 2022-02-02 NOTE — DISCHARGE SUMMARY
Discharge Summary - Cuauhtemoc Monday 54 y o  male MRN: [id number]    Unit/Bed#:  Encounter: [id number]    Admission Date:  02/02/2022    Admitting Diagnosis: Bowel habit changes [R19 4]  Diarrhea, unspecified type [R19 7]  Gastroesophageal reflux disease without esophagitis [K21 9]    HPI:  Screening colonoscopy  Abdominal bloating and change in bowels  Procedures Performed: No orders of the defined types were placed in this encounter  Summary of Hospital Course: Tolerated procedure well    Significant Findings, Care, Treatment and Services Provided:  Esophageal biopsies taken for possible Enlson's esophagus  Reflux esophagitis present  Colon polyp removed  Complications:  None    Discharge Diagnosis:  See above    Medical Problems             Resolved Problems  Date Reviewed: 12/20/2021    None                Condition at Discharge: good         Discharge instructions/Information to patient and family:   See after visit summary for information provided to patient and family  Provisions for Follow-Up Care:  See after visit summary for information related to follow-up care and any pertinent home health orders        PCP: Garry Duane, DO    Disposition: Home

## 2022-02-02 NOTE — H&P
History and Physical - [facility]  Silvia Duarte 54 y o  male MRN: [id number]                  HPI: Silvia Duarte is a 54y o  year old male who presents for history of longstanding gastroesophageal reflux disease  Abdominal bloating  Screening colonoscopy  REVIEW OF SYSTEMS: Per the HPI, and otherwise unremarkable  Historical Information   Past Medical History:   Diagnosis Date    Abdominal bloating     after eating    Anxiety     Asthma     GERD (gastroesophageal reflux disease)     Irritable bowel syndrome     Obesity (BMI 30 0-34  9)     Wears glasses      Past Surgical History:   Procedure Laterality Date    NO PAST SURGERIES      WISDOM TOOTH EXTRACTION       Social History   Social History     Substance and Sexual Activity   Alcohol Use Yes    Comment: rarely     Social History     Substance and Sexual Activity   Drug Use Never     Social History     Tobacco Use   Smoking Status Light Tobacco Smoker    Packs/day: 0 25    Types: Cigarettes   Smokeless Tobacco Never Used   Tobacco Comment    2 cigarettes/day     Family History   Problem Relation Age of Onset    Diabetes Father     Hypertension Father        Meds/Allergies       Current Outpatient Medications:     albuterol (PROVENTIL HFA,VENTOLIN HFA) 90 mcg/act inhaler    bisacodyl (DULCOLAX) 5 mg EC tablet    omega-3-acid ethyl esters (LOVAZA) 1 g capsule    Naltrexone-buPROPion HCl ER (Contrave) 8-90 MG TB12    Naltrexone-buPROPion HCl ER (Contrave) 8-90 MG TB12    omeprazole (PriLOSEC) 20 mg delayed release capsule    Current Facility-Administered Medications:     lactated ringers infusion, 125 mL/hr, Intravenous, Continuous, 125 mL/hr at 02/02/22 1229    Allergies   Allergen Reactions    Penicillins Other (See Comments) and GI Intolerance     Muscle tension, vomiting, diarrhea       Objective     /85   Pulse 79   Temp (!) 97 2 °F (36 2 °C) (Temporal)   Resp 18   Ht 5' 11" (1 803 m)   [employer] 109 kg (240 lb)   SpO2 95%   BMI 33 47 kg/m²       PHYSICAL EXAM    Gen: NAD  Head: NCAT  CV: RRR  CHEST: Clear  ABD: soft, NT/ND  EXT: no edema      ASSESSMENT/PLAN:  This is a 54y o  year old male here for EGD and colonoscopy, and he is stable and optimized for his procedure

## 2022-02-03 PROBLEM — Z86.010 PERSONAL HISTORY OF COLONIC POLYPS: Status: ACTIVE | Noted: 2022-02-03

## 2022-02-03 PROBLEM — Z86.0100 PERSONAL HISTORY OF COLONIC POLYPS: Status: ACTIVE | Noted: 2022-02-03

## 2022-03-01 ENCOUNTER — TELEPHONE (OUTPATIENT)
Dept: FAMILY MEDICINE CLINIC | Facility: CLINIC | Age: 52
End: 2022-03-01

## 2022-03-01 NOTE — TELEPHONE ENCOUNTER
Pt requesting a a refill on a medication   contrave 90 mg  He was given the medication last month and bottle has no refills says Dr Sukhjinder Gates approve  Please advise if an appt is needed

## 2022-03-02 DIAGNOSIS — E66.9 OBESITY (BMI 30-39.9): ICD-10-CM

## 2022-03-02 RX ORDER — NALTREXONE HYDROCHLORIDE AND BUPROPION HYDROCHLORIDE 8; 90 MG/1; MG/1
2 TABLET, EXTENDED RELEASE ORAL 2 TIMES DAILY
Qty: 120 TABLET | Refills: 0 | Status: SHIPPED | OUTPATIENT
Start: 2022-03-02 | End: 2022-03-07

## 2022-03-02 NOTE — TELEPHONE ENCOUNTER
Tried calling pt to let him know refill was done and that he has to keep his appointment  No answer and I was unable to leave a message   Alisa Irby LPN

## 2022-03-07 ENCOUNTER — OFFICE VISIT (OUTPATIENT)
Dept: FAMILY MEDICINE CLINIC | Facility: CLINIC | Age: 52
End: 2022-03-07
Payer: COMMERCIAL

## 2022-03-07 VITALS
WEIGHT: 238 LBS | SYSTOLIC BLOOD PRESSURE: 132 MMHG | RESPIRATION RATE: 20 BRPM | OXYGEN SATURATION: 95 % | TEMPERATURE: 98.2 F | BODY MASS INDEX: 33.32 KG/M2 | DIASTOLIC BLOOD PRESSURE: 84 MMHG | HEART RATE: 79 BPM | HEIGHT: 71 IN

## 2022-03-07 DIAGNOSIS — E78.2 MIXED HYPERLIPIDEMIA: ICD-10-CM

## 2022-03-07 DIAGNOSIS — K21.9 GERD WITHOUT ESOPHAGITIS: ICD-10-CM

## 2022-03-07 DIAGNOSIS — Z23 IMMUNIZATION DUE: ICD-10-CM

## 2022-03-07 DIAGNOSIS — Z12.5 PROSTATE CANCER SCREENING: ICD-10-CM

## 2022-03-07 DIAGNOSIS — Z12.11 COLON CANCER SCREENING: ICD-10-CM

## 2022-03-07 DIAGNOSIS — K42.9 UMBILICAL HERNIA WITHOUT OBSTRUCTION AND WITHOUT GANGRENE: ICD-10-CM

## 2022-03-07 DIAGNOSIS — F17.200 TOBACCO USE DISORDER: ICD-10-CM

## 2022-03-07 DIAGNOSIS — F41.1 GAD (GENERALIZED ANXIETY DISORDER): Primary | ICD-10-CM

## 2022-03-07 DIAGNOSIS — E78.1 HIGH TRIGLYCERIDES: ICD-10-CM

## 2022-03-07 PROCEDURE — 99214 OFFICE O/P EST MOD 30 MIN: CPT | Performed by: FAMILY MEDICINE

## 2022-03-07 PROCEDURE — 3008F BODY MASS INDEX DOCD: CPT | Performed by: FAMILY MEDICINE

## 2022-03-07 PROCEDURE — 90715 TDAP VACCINE 7 YRS/> IM: CPT

## 2022-03-07 PROCEDURE — 90471 IMMUNIZATION ADMIN: CPT

## 2022-03-07 RX ORDER — BUPROPION HYDROCHLORIDE 300 MG/1
300 TABLET ORAL EVERY MORNING
Qty: 90 TABLET | Refills: 1 | Status: SHIPPED | OUTPATIENT
Start: 2022-03-07

## 2022-03-07 NOTE — PROGRESS NOTES
Assessment/Plan:    No problem-specific Assessment & Plan notes found for this encounter  Restart wellbutrin for anxiety  F/u q6m    Tobacco use risks aware    Umbilical hernia new, small, f/u general surgeon when ready    High triglycerides, on lovaza and tolerates, f/u q6m    Gerd/barrets, continue PPI per gi  Recent data suggesting increased risk of ischemic CVA and chronic kidney damage with PPI use was advised  Diagnoses and all orders for this visit:    NIMA (generalized anxiety disorder)  -     buPROPion (Wellbutrin XL) 300 mg 24 hr tablet; Take 1 tablet (300 mg total) by mouth every morning    Umbilical hernia without obstruction and without gangrene    Tobacco use disorder    GERD without esophagitis    Colon cancer screening  -     Comprehensive metabolic panel; Future  -     Ambulatory referral for colonoscopy; Future    Prostate cancer screening  -     PSA, Total Screen; Future    Mixed hyperlipidemia  -     Lipid Panel with Direct LDL reflex; Future    High triglycerides  -     Lipid Panel with Direct LDL reflex; Future    Immunization due  -     TDAP VACCINE GREATER THAN OR EQUAL TO 8YO IM        Return in about 6 months (around 9/7/2022) for Annual physical     Subjective:      Patient ID: Demetrice Wong is a 54 y o  male      Chief Complaint   Patient presents with    Follow-up     3 month    sas/cma       HPI  contrave never covered, paid on own for a while but no significant wt loss  Does go to gym for past month  Not counting calories  Eats often as     bmi noted  Can lose 25# in summer per pt    Was on wellbutrin 300mg qd, now went back on again  Less smoking but never stopped  Medication does help mood and irritability    The following portions of the patient's history were reviewed and updated as appropriate: allergies, current medications, past family history, past medical history, past social history, past surgical history and problem list     Review of Systems   Constitutional: Negative for chills and fever  Current Outpatient Medications   Medication Sig Dispense Refill    albuterol (PROVENTIL HFA,VENTOLIN HFA) 90 mcg/act inhaler INHALE 2 PUFFS BY MOUTH EVERY 6 HOURS AS NEEDED FOR WHEEZING OR SHORTNESS OF BREATH  RINSE AND SPIT AFTER USE 54 g 1    omega-3-acid ethyl esters (LOVAZA) 1 g capsule TAKE 2 CAPSULES BY MOUTH TWICE DAILY 360 capsule 1    omeprazole (PriLOSEC) 20 mg delayed release capsule Take 20 mg by mouth daily      buPROPion (Wellbutrin XL) 300 mg 24 hr tablet Take 1 tablet (300 mg total) by mouth every morning 90 tablet 1     No current facility-administered medications for this visit  Objective:    /84   Pulse 79   Temp 98 2 °F (36 8 °C)   Resp 20   Ht 5' 11" (1 803 m)   Wt 108 kg (238 lb)   SpO2 95%   BMI 33 19 kg/m²        Physical Exam  Vitals and nursing note reviewed  Constitutional:       Appearance: He is well-developed  He is obese  He is not ill-appearing  HENT:      Head: Normocephalic  Right Ear: Tympanic membrane normal       Left Ear: Tympanic membrane normal    Eyes:      Conjunctiva/sclera: Conjunctivae normal    Cardiovascular:      Rate and Rhythm: Normal rate and regular rhythm  Heart sounds: No murmur heard  Pulmonary:      Effort: Pulmonary effort is normal  No respiratory distress  Breath sounds: No wheezing  Abdominal:      Palpations: Abdomen is soft  Tenderness: There is no abdominal tenderness  Hernia: A hernia is present  Hernia is present in the umbilical area  Musculoskeletal:         General: No deformity  Cervical back: Neck supple  Skin:     General: Skin is warm and dry  Coloration: Skin is not pale  Neurological:      Mental Status: He is alert  Motor: No weakness  Gait: Gait normal    Psychiatric:         Mood and Affect: Mood normal          Behavior: Behavior normal          Thought Content: Thought content normal          BMI Counseling:  Body mass index is 33 19 kg/m²  The BMI is above normal  Nutrition recommendations include decreasing portion sizes and moderation in carbohydrate intake  Exercise recommendations include exercising 3-5 times per week  No pharmacotherapy was ordered  Rationale for BMI follow-up plan is due to patient being overweight or obese              Norm Mina, DO

## 2022-03-30 DIAGNOSIS — E78.1 HIGH TRIGLYCERIDES: ICD-10-CM

## 2022-03-30 RX ORDER — OMEGA-3-ACID ETHYL ESTERS 1 G/1
CAPSULE, LIQUID FILLED ORAL
Qty: 360 CAPSULE | Refills: 1 | Status: SHIPPED | OUTPATIENT
Start: 2022-03-30

## 2022-10-05 DIAGNOSIS — F41.1 GAD (GENERALIZED ANXIETY DISORDER): ICD-10-CM

## 2022-10-05 RX ORDER — BUPROPION HYDROCHLORIDE 300 MG/1
TABLET ORAL
Qty: 30 TABLET | Refills: 0 | Status: SHIPPED | OUTPATIENT
Start: 2022-10-05

## 2022-10-11 PROBLEM — Z12.11 COLON CANCER SCREENING: Status: RESOLVED | Noted: 2022-03-07 | Resolved: 2022-10-11

## 2022-10-12 PROBLEM — Z13.1 SCREENING FOR DIABETES MELLITUS (DM): Status: RESOLVED | Noted: 2021-05-18 | Resolved: 2022-10-12

## 2022-10-12 PROBLEM — Z12.5 PROSTATE CANCER SCREENING: Status: RESOLVED | Noted: 2021-05-18 | Resolved: 2022-10-12

## 2022-10-12 PROBLEM — Z13.89 SCREENING FOR CARDIOVASCULAR, RESPIRATORY, AND GENITOURINARY DISEASES: Status: RESOLVED | Noted: 2021-05-18 | Resolved: 2022-10-12

## 2022-10-12 PROBLEM — Z13.6 SCREENING FOR CARDIOVASCULAR, RESPIRATORY, AND GENITOURINARY DISEASES: Status: RESOLVED | Noted: 2021-05-18 | Resolved: 2022-10-12

## 2022-10-12 PROBLEM — Z13.83 SCREENING FOR CARDIOVASCULAR, RESPIRATORY, AND GENITOURINARY DISEASES: Status: RESOLVED | Noted: 2021-05-18 | Resolved: 2022-10-12

## 2023-07-04 ENCOUNTER — HOSPITAL ENCOUNTER (EMERGENCY)
Facility: HOSPITAL | Age: 53
Discharge: HOME | End: 2023-07-04
Attending: EMERGENCY MEDICINE
Payer: COMMERCIAL

## 2023-07-04 ENCOUNTER — TELEPHONE (OUTPATIENT)
Dept: FAMILY MEDICINE CLINIC | Facility: CLINIC | Age: 53
End: 2023-07-04

## 2023-07-04 ENCOUNTER — APPOINTMENT (EMERGENCY)
Dept: RADIOLOGY | Facility: HOSPITAL | Age: 53
End: 2023-07-04
Payer: COMMERCIAL

## 2023-07-04 VITALS
TEMPERATURE: 97.6 F | OXYGEN SATURATION: 95 % | DIASTOLIC BLOOD PRESSURE: 76 MMHG | HEART RATE: 72 BPM | SYSTOLIC BLOOD PRESSURE: 125 MMHG | RESPIRATION RATE: 14 BRPM

## 2023-07-04 DIAGNOSIS — J20.9 ACUTE BRONCHITIS, UNSPECIFIED ORGANISM: Primary | ICD-10-CM

## 2023-07-04 DIAGNOSIS — J06.9 VIRAL UPPER RESPIRATORY TRACT INFECTION: ICD-10-CM

## 2023-07-04 LAB
ATRIAL RATE: 84 BPM
P AXIS: 36 DEGREES
PR INTERVAL: 138 MS
QRS AXIS: 57 DEGREES
QRSD INTERVAL: 110 MS
QT INTERVAL: 386 MS
QTC INTERVAL: 456 MS
T WAVE AXIS: 71 DEGREES
VENTRICULAR RATE: 84 BPM

## 2023-07-04 PROCEDURE — 93010 ELECTROCARDIOGRAM REPORT: CPT | Performed by: INTERNAL MEDICINE

## 2023-07-04 PROCEDURE — 93005 ELECTROCARDIOGRAM TRACING: CPT

## 2023-07-04 PROCEDURE — 99283 EMERGENCY DEPT VISIT LOW MDM: CPT

## 2023-07-04 PROCEDURE — 99284 EMERGENCY DEPT VISIT MOD MDM: CPT | Performed by: EMERGENCY MEDICINE

## 2023-07-04 PROCEDURE — 71046 X-RAY EXAM CHEST 2 VIEWS: CPT

## 2023-07-04 RX ORDER — ALBUTEROL SULFATE 2.5 MG/3ML
5 SOLUTION RESPIRATORY (INHALATION) ONCE
Status: COMPLETED | OUTPATIENT
Start: 2023-07-04 | End: 2023-07-04

## 2023-07-04 RX ORDER — PREDNISONE 20 MG/1
40 TABLET ORAL DAILY
Qty: 10 TABLET | Refills: 0 | Status: SHIPPED | OUTPATIENT
Start: 2023-07-04 | End: 2023-07-09

## 2023-07-04 RX ADMIN — ALBUTEROL SULFATE 5 MG: 2.5 SOLUTION RESPIRATORY (INHALATION) at 09:13

## 2023-07-04 RX ADMIN — IPRATROPIUM BROMIDE 0.5 MG: 0.5 SOLUTION RESPIRATORY (INHALATION) at 09:13

## 2023-07-04 NOTE — TELEPHONE ENCOUNTER
Change attribution? He saw a new pcp in [facility] to establish on 10/17/22. Notes are in the chart.

## 2023-07-04 NOTE — ED ATTENDING ATTESTATION
7/4/2023  I, Karrie Quigley MD, saw and evaluated the patient. I have discussed the patient with the resident/non-physician practitioner and agree with the resident's/non-physician practitioner's findings, Plan of Care, and MDM as documented in the resident's/non-physician practitioner's note, except where noted. All available labs and Radiology studies were reviewed. I was present for key portions of any procedure(s) performed by the resident/non-physician practitioner and I was immediately available to provide assistance. At this point I agree with the current assessment done in the Emergency Department. I have conducted an independent evaluation of this patient a history and physical is as follows:    ED Course     Patient presents for evaluation due to 5 days of cough and shortness of breath. Patient is concerned because he states that he was exposed to black mold. 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 No fevers. No additional complaints. A/P: Cough, shortness of breath. Will check chest x-ray to evaluate for pneumonia.     Critical Care Time  Procedures

## 2023-07-04 NOTE — DISCHARGE INSTRUCTIONS
I prescribed prednisone. Please take it as prescribed. Please come back to the hospital if any new concerning symptoms occur or if symptoms get worse.

## 2023-07-04 NOTE — ED PROVIDER NOTES
History  Chief Complaint   Patient presents with   • Cough     PT STATES THAT HE HAS BEEN HAVING COUGHING EPISODES FOR THE PAST 2 WEEKS WHICH CAUSE HIM TO BE SOB. No SOB when not coughing. Hx of asthma     Lyndon Canas a 47 y/o male  comes in with complaint of SOB and cough with white sputum production for the past five days. He states that he has never experienced any symptoms like this before but was exposed to black mold at his old apartment before moving on 06/29/23. His symptoms got worse after moving and exerting himself. 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 He is a smoker for the past ten years but has recently reduced his intake to 2-3 cigarettes daily. He has environmental allergies alleviated with zyrtec. History provided by:  Patient   used: No    Cough  Cough characteristics:  Hacking and hoarse  Sputum characteristics:  White  Severity:  Moderate  Onset quality:  Gradual  Duration:  5 days  Timing:  Constant  Chronicity:  New  Smoker: yes    Context: exposure to allergens, occupational exposure, smoke exposure and weather changes    Relieved by:  Nothing  Worsened by: Activity  Associated symptoms: shortness of breath and wheezing        Prior to Admission Medications   Prescriptions Last Dose Informant Patient Reported? Taking? albuterol (PROVENTIL HFA,VENTOLIN HFA) 90 mcg/act inhaler  Self No No   Sig: INHALE 2 PUFFS BY MOUTH EVERY 6 HOURS AS NEEDED FOR WHEEZING OR SHORTNESS OF BREATH.  RINSE AND SPIT AFTER USE   buPROPion (WELLBUTRIN XL) 300 mg 24 hr tablet   No No   Sig: TAKE 1 TABLET(300 MG) BY MOUTH EVERY MORNING   omega-3-acid ethyl esters (LOVAZA) 1 g capsule   No No   Sig: TAKE 2 CAPSULES BY MOUTH TWICE DAILY   omeprazole (PriLOSEC) 20 mg delayed release capsule   Yes No   Sig: Take 20 mg by mouth daily      Facility-Administered Medications: None       Past Medical History:   Diagnosis Date   • Abdominal bloating     after eating   • Anxiety    • Asthma    • GERD (gastroesophageal reflux disease)    • Irritable bowel syndrome    • Obesity (BMI 30.0-34. 9)    • Wears glasses        Past Surgical History:   Procedure Laterality Date   • NO PAST SURGERIES     • WISDOM TOOTH EXTRACTION         Family History   Problem Relation Age of Onset   • Diabetes Father    • Hypertension Father      I have reviewed and agree with the history as documented. E-Cigarette/Vaping   • E-Cigarette Use Never User      E-Cigarette/Vaping Substances   • Nicotine No    • THC No    • CBD No    • Flavoring No    • Other No    • Unknown No      Social History     Tobacco Use   • Smoking status: Every Day     Packs/day: 0.25     Types: Cigarettes   • Smokeless tobacco: Never   • Tobacco comments:     2 cigarettes/day   Vaping Use   • Vaping Use: Never used   Substance Use Topics   • Alcohol use: Yes     Alcohol/week: 3.0 standard drinks of alcohol     Types: 3 Cans of beer per week     Comment: rarely   • Drug use: Never        Review of Systems   Constitutional: Negative for fatigue. HENT: Positive for congestion. Negative for voice change. Respiratory: Positive for cough, chest tightness, shortness of breath and wheezing. Allergic/Immunologic: Positive for environmental allergies. Neurological: Negative.         Physical Exam  ED Triage Vitals   Temperature Pulse Respirations Blood Pressure SpO2   07/04/23 0759 07/04/23 0759 07/04/23 0759 07/04/23 0759 07/04/23 0759   97.6 °F (36.4 °C) 89 20 132/66 94 %      Temp Source Heart Rate Source Patient Position - Orthostatic VS BP Location FiO2 (%)   07/04/23 0759 07/04/23 0759 07/04/23 0759 07/04/23 0759 --   Oral Monitor Lying Left arm       Pain Score       07/04/23 0830       No Pain             Orthostatic Vital Signs  Vitals:    07/04/23 0759 07/04/23 0830   BP: 132/66 125/76   Pulse: 89 72   Patient Position - Orthostatic VS: Lying Lying       Physical Exam  Vitals and nursing note reviewed. Constitutional:       Appearance: He is obese. HENT:      Head: Normocephalic. Right Ear: Tympanic membrane normal.      Left Ear: Tympanic membrane normal.      Nose: Nose normal.      Mouth/Throat:      Mouth: Mucous membranes are moist.      Pharynx: Oropharyngeal exudate present. Cardiovascular:      Rate and Rhythm: Normal rate. Pulses: Normal pulses. Heart sounds: Normal heart sounds. Pulmonary:      Breath sounds: Wheezing present. Chest:      Chest wall: Tenderness present. Neurological:      Mental Status: He is alert. ED Medications  Medications   albuterol inhalation solution 5 mg (5 mg Nebulization Given 7/4/23 0913)   ipratropium (ATROVENT) 0.02 % inhalation solution 0.5 mg (0.5 mg Nebulization Given 7/4/23 0913)       Diagnostic Studies  Results Reviewed     None                 XR chest 2 views   ED Interpretation by Abdiaziz Rashdi MD (07/04 2396)   The xray was ordered by me and interpreted by me independently. On my read, it appears normal without acute cardio or pulmonary abnormalities. Procedures  Procedures      ED Course  ED Course as of 07/04/23 0933 Tue Jul 04, 2023   3692 Procedure Note: EKG  Date/Time: 07/04/23 8:54 AM   Interpreted by: Abdiaziz Rashid  Indications / Diagnosis: sob  ECG reviewed by me, the ED Provider: yes   The EKG demonstrates:  Rhythm: normal sinus  Intervals: normal intervals  Axis: normal axis  QRS/Blocks: normal QRS  ST Changes: No acute ST Changes, no STD/BRENT. SBIRT 22yo+    Flowsheet Row Most Recent Value   Initial Alcohol Screen: US AUDIT-C     1. How often do you have a drink containing alcohol? 2 Filed at: 07/04/2023 0801   2. How many drinks containing alcohol do you have on a typical day you are drinking? 3 Filed at: 07/04/2023 0801   3a. Male UNDER 65: How often do you have five or more drinks on one occasion? 0 Filed at: 07/04/2023 0801   3b.  FEMALE Any Age, or MALE 65+: How often do you have 4 or more drinks on one occassion? 0 Filed at: 07/04/2023 0801   Audit-C Score 5 Filed at: 07/04/2023 7729   FARRUKH: How many times in the past year have you. .. Used an illegal drug or used a prescription medication for non-medical reasons? Never Filed at: 07/04/2023 0801                MDM   Patient is a 48 y.o. male with PMH of GERD who presents to the ED with SOB. Vital signs 125/76, 97.6, 72HR, 14RR SP02 95. On exam the patient has audible wheezing in the upper airway    History and physical exam most consistent with acute bronchitis. However, differential diagnosis included but not limited to upper respiratory infection. Plan to discharge with corticosteroid. View ED course above for further discussion on patient workup. Disposition  Final diagnoses:   None     ED Disposition     None      Follow-up Information    None         Patient's Medications   Discharge Prescriptions    No medications on file     No discharge procedures on file. PDMP Review     None           ED Provider  Attending physically available and evaluated [facility]. I managed the patient along with the ED Attending.     Electronically Signed by         Salina Gottron, MD  07/04/23 9233 Krystle May Add [J20.9] Acute bronchitis, unspecified organism     7/4/2023 10:27 AM Krystle May Modify [J06.9] Viral upper respiratory tract infection     7/4/2023 10:27 AM Krystle May Modify [J20.9] Acute bronchitis, unspecified organism       ED Disposition     ED Disposition   Discharge    Condition   Stable    Date/Time   Tue Jul 4, 2023 10:25 AM    Comment   Rosa Mead discharge to home/self care. Follow-up Information     Follow up With Specialties Details Why Contact Info Additional Information    Simon Gorman,  Family Medicine Schedule an appointment as soon as possible for a visit  As needed [address] [place] [address] Emergency Department Emergency Medicine Go to  As needed, If symptoms worsen [address] [id number]  [facility] Emergency Department, [address] [place] [address]          Patient's Medications   Discharge Prescriptions    No medications on file     No discharge procedures on file. PDMP Review     None           ED Provider  Attending physically available and evaluated Rosa Mead. I managed the patient along with the ED Attending.     Electronically Signed by         Krystle May MD  07/04/23 [facility] [address], MD  07/11/23 Jerica Gutiérrez

## 2023-07-06 NOTE — TELEPHONE ENCOUNTER
07/06/23 2:37 PM        The office's request has been received, reviewed, and the patient chart updated. The PCP has successfully been removed with a patient attribution note. This message will now be completed.         Thank you  Jacinda Crawford

## 2024-01-24 ENCOUNTER — TELEPHONE (OUTPATIENT)
Dept: GASTROENTEROLOGY | Facility: CLINIC | Age: 54
End: 2024-01-24

## 2024-01-24 NOTE — TELEPHONE ENCOUNTER
Pt is due for an EGD for Nelson's, GERD w/ esophagitis with Dr Myles, however, he has retired.  I lmom informing pt of this and to please call back, that we can get him scheduled with one of his partners.  Dr. Roblero does have availability on 2/1 and 2/7 at [facility].  Will call pt again if do not hear back from him.

## 2024-03-04 ENCOUNTER — TELEPHONE (OUTPATIENT)
Dept: GASTROENTEROLOGY | Facility: CLINIC | Age: 54
End: 2024-03-04

## 2025-07-09 ENCOUNTER — HOSPITAL ENCOUNTER (EMERGENCY)
Facility: HOSPITAL | Age: 55
Discharge: HOME | End: 2025-07-09
Attending: EMERGENCY MEDICINE

## 2025-07-09 VITALS
RESPIRATION RATE: 17 BRPM | HEART RATE: 64 BPM | DIASTOLIC BLOOD PRESSURE: 83 MMHG | TEMPERATURE: 98.3 F | SYSTOLIC BLOOD PRESSURE: 162 MMHG | OXYGEN SATURATION: 98 %

## 2025-07-09 DIAGNOSIS — R55 SYNCOPE: Primary | ICD-10-CM

## 2025-07-09 LAB
ALBUMIN SERPL BCG-MCNC: 4 G/DL (ref 3.5–5)
ALP SERPL-CCNC: 63 U/L (ref 34–104)
ALT SERPL W P-5'-P-CCNC: 11 U/L (ref 7–52)
ANION GAP SERPL CALCULATED.3IONS-SCNC: 6 MMOL/L (ref 4–13)
AST SERPL W P-5'-P-CCNC: 17 U/L (ref 13–39)
ATRIAL RATE: 62 BPM
BASOPHILS # BLD AUTO: 0.08 THOUSANDS/ÂΜL (ref 0–0.1)
BASOPHILS NFR BLD AUTO: 1 % (ref 0–1)
BILIRUB SERPL-MCNC: 0.28 MG/DL (ref 0.2–1)
BUN SERPL-MCNC: 11 MG/DL (ref 5–25)
CALCIUM SERPL-MCNC: 9 MG/DL (ref 8.4–10.2)
CARDIAC TROPONIN I PNL SERPL HS: 6 NG/L (ref ?–50)
CHLORIDE SERPL-SCNC: 107 MMOL/L (ref 96–108)
CO2 SERPL-SCNC: 26 MMOL/L (ref 21–32)
CREAT SERPL-MCNC: 0.8 MG/DL (ref 0.6–1.3)
EOSINOPHIL # BLD AUTO: 0.39 THOUSAND/ÂΜL (ref 0–0.61)
EOSINOPHIL NFR BLD AUTO: 4 % (ref 0–6)
ERYTHROCYTE [DISTWIDTH] IN BLOOD BY AUTOMATED COUNT: 13 % (ref 11.6–15.1)
GFR SERPL CREATININE-BSD FRML MDRD: 101 ML/MIN/1.73SQ M
GLUCOSE SERPL-MCNC: 103 MG/DL (ref 65–140)
HCT VFR BLD AUTO: 42.9 % (ref 36.5–49.3)
HGB BLD-MCNC: 15.4 G/DL (ref 12–17)
IMM GRANULOCYTES # BLD AUTO: 0.03 THOUSAND/UL (ref 0–0.2)
IMM GRANULOCYTES NFR BLD AUTO: 0 % (ref 0–2)
LYMPHOCYTES # BLD AUTO: 2.55 THOUSANDS/ÂΜL (ref 0.6–4.47)
LYMPHOCYTES NFR BLD AUTO: 28 % (ref 14–44)
MCH RBC QN AUTO: 31.8 PG (ref 26.8–34.3)
MCHC RBC AUTO-ENTMCNC: 35.9 G/DL (ref 31.4–37.4)
MCV RBC AUTO: 89 FL (ref 82–98)
MONOCYTES # BLD AUTO: 0.86 THOUSAND/ÂΜL (ref 0.17–1.22)
MONOCYTES NFR BLD AUTO: 9 % (ref 4–12)
NEUTROPHILS # BLD AUTO: 5.21 THOUSANDS/ÂΜL (ref 1.85–7.62)
NEUTS SEG NFR BLD AUTO: 58 % (ref 43–75)
NRBC BLD AUTO-RTO: 0 /100 WBCS
P AXIS: 36 DEGREES
PLATELET # BLD AUTO: 283 THOUSANDS/UL (ref 149–390)
PMV BLD AUTO: 9.6 FL (ref 8.9–12.7)
POTASSIUM SERPL-SCNC: 3.7 MMOL/L (ref 3.5–5.3)
PR INTERVAL: 118 MS
PROT SERPL-MCNC: 7.1 G/DL (ref 6.4–8.4)
QRS AXIS: 91 DEGREES
QRSD INTERVAL: 100 MS
QT INTERVAL: 416 MS
QTC INTERVAL: 422 MS
RBC # BLD AUTO: 4.85 MILLION/UL (ref 3.88–5.62)
SODIUM SERPL-SCNC: 139 MMOL/L (ref 135–147)
T WAVE AXIS: 56 DEGREES
VENTRICULAR RATE: 62 BPM
WBC # BLD AUTO: 9.12 THOUSAND/UL (ref 4.31–10.16)

## 2025-07-09 PROCEDURE — 93005 ELECTROCARDIOGRAM TRACING: CPT

## 2025-07-09 PROCEDURE — 99284 EMERGENCY DEPT VISIT MOD MDM: CPT | Performed by: EMERGENCY MEDICINE

## 2025-07-09 PROCEDURE — 99284 EMERGENCY DEPT VISIT MOD MDM: CPT

## 2025-07-09 PROCEDURE — 93010 ELECTROCARDIOGRAM REPORT: CPT | Performed by: STUDENT IN AN ORGANIZED HEALTH CARE EDUCATION/TRAINING PROGRAM

## 2025-07-09 PROCEDURE — 84484 ASSAY OF TROPONIN QUANT: CPT

## 2025-07-09 PROCEDURE — 85025 COMPLETE CBC W/AUTO DIFF WBC: CPT

## 2025-07-09 PROCEDURE — 80053 COMPREHEN METABOLIC PANEL: CPT

## 2025-07-09 PROCEDURE — 36415 COLL VENOUS BLD VENIPUNCTURE: CPT

## 2025-07-09 NOTE — DISCHARGE INSTRUCTIONS
You have been seen in the emergency department for evaluation of a syncopal episode.  This is likely due to anxiety or stress.    Your workup today was normal.  Please follow-up with your primary care doctor when able and return to the emergency department if you continue to have ongoing or worrisome symptoms.    Patient medically cleared for incarceration.

## 2025-07-09 NOTE — ED PROVIDER NOTES
Time reflects when diagnosis was documented in both MDM as applicable and the Disposition within this note       Time User Action Codes Description Comment    7/9/2025 10:05 AM Francine Allen Add [R55] Syncope           ED Disposition       ED Disposition   Discharge    Condition   Stable    Date/Time   Wed Jul 9, 2025 10:05 AM    Comment   Stewart Dowell discharge to home/self care.                   Assessment & Plan       Medical Decision Making      ED Course as of 07/10/25 0822   Wed Jul 09, 2025   0844 Patient seen and evaluated by me  DDx: Appears to be episode of vasovagal syncope.  Patient does not have any risk factors for sudden cardiac death.  Story does not support neurogenic or cardiogenic cause of syncope.  No witnessed seizure-like activity.  Patient has small abrasion to his right forehead that he is unsure if this was sustained during the episode or not.  No otherwise signs of trauma or pain.  Patient does not take any blood thinning medications-do not need to evaluate for head injury.  No C-spine tenderness to raise concern for C-spine injury.  Workup and plan: EKG.  First nurse labs obtained and in process, will follow up on them.   0845 EKG done at 0845 interpreted by me   rate 62  rhythm normal sinus  axis normal  QRS complexes are normal  Intervals are normal  ST segments showing no ischemic changes  EKG does not reveal any increased voltage to suggest HOCM, delta waves, A-V dissociation, QT prolongation, Brugada pattern, or epsilon waves.   1005 Labs all appear normal, troponin is 6, however in the absence of chest pain no indication for repeat 2-hour troponin.  Patient discharged at this time, given return precautions and follow-up information.  Medically cleared for incarceration.       Medications - No data to display    ED Risk Strat Scores                    No data recorded                            History of Present Illness       Chief Complaint   Patient presents with    Syncope      Pt was in cell block and had syncopal episode. +Hs, denies thinners, ASA. Small abrasion to R side of forehead. Felt overwhelmed. Denies any complaints       Past Medical History[1]   Past Surgical History[2]   Family History[3]   Social History[4]   E-Cigarette/Vaping    E-Cigarette Use Never User       E-Cigarette/Vaping Substances    Nicotine No     THC No     CBD No     Flavoring No     Other No     Unknown No       I have reviewed and agree with the history as documented.     Patient is a 55-year-old male, brought in by police due to a syncopal episode.  Patient was in his cell block sitting on a chair today when he got bad news regarding sentencing and had a syncopal episode in which he leaned forward and rolled over to the side onto the ground.  Patient did hit his head when he fell, he was reportedly unconscious for less than 15 seconds and responded quickly to a light sternal rub.  Patient is not on any blood thinners.  He does have some erythema to the right side of his forehead for which he is unsure if that happened during the episode because he reportedly hit his head on the other side.  Patient currently reports still feeling overwhelmed, but he denies ever having any chest pain, shortness of breath, or headache today.  He denies any nausea or vomiting.  He denies any otherwise recent head trauma.  He denies family history of sudden cardiac death or personal history of cardiac disease.  He denies any cardiac disease at early ages in his family.          Review of Systems   Constitutional:  Negative for chills, fatigue and fever.   HENT:  Negative for congestion.    Respiratory:  Negative for cough, chest tightness and shortness of breath.    Cardiovascular:  Negative for chest pain.   Gastrointestinal:  Negative for abdominal pain, diarrhea, nausea and vomiting.   Genitourinary:  Negative for difficulty urinating.   Skin:  Negative for color change.   Neurological:  Positive for syncope. Negative  for dizziness, weakness, numbness and headaches.           Objective       ED Triage Vitals   Temperature Pulse Blood Pressure Respirations SpO2 Patient Position - Orthostatic VS   07/09/25 0840 07/09/25 0839 07/09/25 0839 07/09/25 0839 07/09/25 0839 --   98.3 °F (36.8 °C) 65 (!) 180/95 20 98 %       Temp Source Heart Rate Source BP Location FiO2 (%) Pain Score    07/09/25 0840 07/09/25 0839 -- -- 07/09/25 0839    Oral Monitor   No Pain      Vitals      Date and Time Temp Pulse SpO2 Resp BP Pain Score FACES Pain Rating User   07/09/25 1000 -- 64 98 % 17 162/83 -- --    07/09/25 0928 -- 71 99 % 18 174/96 -- --    07/09/25 0840 98.3 °F (36.8 °C) -- -- -- -- -- --    07/09/25 0839 -- 65 98 % 20 180/95 No Pain --             Physical Exam  Vitals and nursing note reviewed.   Constitutional:       General: He is not in acute distress.     Appearance: Normal appearance. He is not ill-appearing or toxic-appearing.   HENT:      Head: Normocephalic.      Comments: Small area of erythema to the right frontal head.     Mouth/Throat:      Mouth: Mucous membranes are moist.     Eyes:      General: No scleral icterus.     Extraocular Movements: Extraocular movements intact.       Cardiovascular:      Rate and Rhythm: Normal rate and regular rhythm.      Pulses: Normal pulses.      Heart sounds: Normal heart sounds. No murmur heard.  Pulmonary:      Effort: Pulmonary effort is normal. No respiratory distress.      Breath sounds: Normal breath sounds. No wheezing or rhonchi.   Abdominal:      General: Abdomen is flat. There is no distension.      Palpations: Abdomen is soft.      Tenderness: There is no abdominal tenderness.     Musculoskeletal:         General: No swelling, tenderness, deformity or signs of injury. Normal range of motion.      Cervical back: Normal range of motion.      Right lower leg: No edema.      Left lower leg: No edema.     Skin:     General: Skin is warm.      Capillary Refill: Capillary refill  takes less than 2 seconds.     Neurological:      General: No focal deficit present.      Mental Status: He is alert.      Cranial Nerves: No cranial nerve deficit.      Sensory: No sensory deficit.      Motor: No weakness.      Gait: Gait normal.     Psychiatric:         Mood and Affect: Mood normal.         Behavior: Behavior normal.         Results Reviewed       Procedure Component Value Units Date/Time    Comprehensive metabolic panel [[id number]] Collected: 07/09/25 0844    Lab Status: Final result Specimen: Blood from Arm, Right Updated: 07/09/25 0917     Sodium 139 mmol/L      Potassium 3.7 mmol/L      Chloride 107 mmol/L      CO2 26 mmol/L      ANION GAP 6 mmol/L      BUN 11 mg/dL      Creatinine 0.80 mg/dL      Glucose 103 mg/dL      Calcium 9.0 mg/dL      AST 17 U/L      ALT 11 U/L      Alkaline Phosphatase 63 U/L      Total Protein 7.1 g/dL      Albumin 4.0 g/dL      Total Bilirubin 0.28 mg/dL      eGFR 101 ml/min/1.73sq m     Narrative:      National Kidney Disease Foundation guidelines for Chronic Kidney Disease (CKD):     Stage 1 with normal or high GFR (GFR > 90 mL/min/1.73 square meters)    Stage 2 Mild CKD (GFR = 60-89 mL/min/1.73 square meters)    Stage 3A Moderate CKD (GFR = 45-59 mL/min/1.73 square meters)    Stage 3B Moderate CKD (GFR = 30-44 mL/min/1.73 square meters)    Stage 4 Severe CKD (GFR = 15-29 mL/min/1.73 square meters)    Stage 5 End Stage CKD (GFR <15 mL/min/1.73 square meters)  Note: GFR calculation is accurate only with a steady state creatinine    HS Troponin 0hr (reflex protocol) [[id number]]  (Normal) Collected: 07/09/25 0844    Lab Status: Final result Specimen: Blood from Arm, Right Updated: 07/09/25 0917     hs TnI 0hr 6 ng/L     CBC and differential [[id number]] Collected: 07/09/25 0844    Lab Status: Final result Specimen: Blood from Arm, Right Updated: 07/09/25 0855     WBC 9.12 Thousand/uL      RBC 4.85 Million/uL      Hemoglobin 15.4 g/dL      Hematocrit 42.9 %      MCV 89  fL      MCH 31.8 pg      MCHC 35.9 g/dL      RDW 13.0 %      MPV 9.6 fL      Platelets 283 Thousands/uL      nRBC 0 /100 WBCs      Segmented % 58 %      Immature Grans % 0 %      Lymphocytes % 28 %      Monocytes % 9 %      Eosinophils Relative 4 %      Basophils Relative 1 %      Absolute Neutrophils 5.21 Thousands/µL      Absolute Immature Grans 0.03 Thousand/uL      Absolute Lymphocytes 2.55 Thousands/µL      Absolute Monocytes 0.86 Thousand/µL      Eosinophils Absolute 0.39 Thousand/µL      Basophils Absolute 0.08 Thousands/µL             No orders to display       Procedures    ED Medication and Procedure Management   Prior to Admission Medications   Prescriptions Last Dose Informant Patient Reported? Taking?   albuterol (PROVENTIL HFA,VENTOLIN HFA) 90 mcg/act inhaler  Self No No   Sig: INHALE 2 PUFFS BY MOUTH EVERY 6 HOURS AS NEEDED FOR WHEEZING OR SHORTNESS OF BREATH. RINSE AND SPIT AFTER USE   buPROPion (WELLBUTRIN XL) 300 mg 24 hr tablet   No No   Sig: TAKE 1 TABLET(300 MG) BY MOUTH EVERY MORNING   omega-3-acid ethyl esters (LOVAZA) 1 g capsule   No No   Sig: TAKE 2 CAPSULES BY MOUTH TWICE DAILY   omeprazole (PriLOSEC) 20 mg delayed release capsule   Yes No   Sig: Take 20 mg by mouth daily      Facility-Administered Medications: None     Discharge Medication List as of 7/9/2025 10:05 AM        CONTINUE these medications which have NOT CHANGED    Details   albuterol (PROVENTIL HFA,VENTOLIN HFA) 90 mcg/act inhaler INHALE 2 PUFFS BY MOUTH EVERY 6 HOURS AS NEEDED FOR WHEEZING OR SHORTNESS OF BREATH. RINSE AND SPIT AFTER USE, Normal      buPROPion (WELLBUTRIN XL) 300 mg 24 hr tablet TAKE 1 TABLET(300 MG) BY MOUTH EVERY MORNING, Normal      omega-3-acid ethyl esters (LOVAZA) 1 g capsule TAKE 2 CAPSULES BY MOUTH TWICE DAILY, Normal      omeprazole (PriLOSEC) 20 mg delayed release capsule Take 20 mg by mouth daily, Historical Med           No discharge procedures on file.  ED SEPSIS DOCUMENTATION   Time reflects  when diagnosis was documented in both MDM as applicable and the Disposition within this note       Time User Action Codes Description Comment    7/9/2025 10:05 AM Francine Allen Add [R55] Syncope                      [1]   Past Medical History:  Diagnosis Date    Abdominal bloating     after eating    Anxiety     Asthma     GERD (gastroesophageal reflux disease)     Irritable bowel syndrome     Obesity (BMI 30.0-34.9)     Wears glasses    [2]   Past Surgical History:  Procedure Laterality Date    NO PAST SURGERIES      WISDOM TOOTH EXTRACTION     [3]   Family History  Problem Relation Name Age of Onset    Diabetes Father      Hypertension Father     [4]   Social History  Tobacco Use    Smoking status: Every Day     Current packs/day: 0.25     Types: Cigarettes    Smokeless tobacco: Never    Tobacco comments:     2 cigarettes/day   Vaping Use    Vaping status: Never Used   Substance Use Topics    Alcohol use: Yes     Alcohol/week: 3.0 standard drinks of alcohol     Types: 3 Cans of beer per week     Comment: rarely    Drug use: Never        Francine Allen MD  07/10/25 0894

## 2025-07-11 NOTE — ED ATTENDING ATTESTATION
7/9/2025   IGaby MD, saw and evaluated the patient. I have discussed the patient with the resident/non-physician practitioner and agree with the resident's/non-physician practitioner's findings, Plan of Care, and MDM as documented in the resident's/non-physician practitioner's note, except where noted. All available labs and Radiology studies were reviewed.  I was present for key portions of any procedure(s) performed by the resident/non-physician practitioner and I was immediately available to provide assistance.       At this point I agree with the current assessment done in the Emergency Department.  I have conducted an independent evaluation of this patient a history and physical is as follows:    Unit/Bed#: ED 17 Encounter: [id number]    Chief Complaint   Patient presents with    Syncope     Pt was in cell block and had syncopal episode. +Hs, denies thinners, ASA. Small abrasion to R side of forehead. Felt overwhelmed. Denies any complaints     55-year-old male presenting after syncopal episode.  Patient received the bad news and proceeded to lose consciousness.  He slowly fell from chair onto the ground.  Per witness, patient was unconscious for seconds, there was no seizure activity and he   Patient denies any chest pain or shortness of breath. regained consciousness shortly thereafter.  Patient did not strike his head.  He is not anticoagulated and not on antiplatelet agents.  At present, still feels overwhelmed by the bad news he received but otherwise reports feeling well.    Physical Exam  ED Triage Vitals   Temperature Pulse Respirations Blood Pressure SpO2   07/09/25 0840 07/09/25 0839 07/09/25 0839 07/09/25 0839 07/09/25 0839   98.3 °F (36.8 °C) 65 20 (!) 180/95 98 %      Temp Source Heart Rate Source Patient Position - Orthostatic VS BP Location FiO2 (%)   07/09/25 0840 07/09/25 0839 -- -- --   Oral Monitor         Pain Score       07/09/25 0839       No Pain           Vital signs and  "nursing notes reviewed    CONSTITUTIONAL: male appearing stated age resting in bed, in no acute distress  HEENT: atraumatic, normocephalic. Sclera anicteric, conjunctiva are not injected. Moist oral mucosa  CARDIOVASCULAR/CHEST: RRR, no M/R/G. 2+ radial pulses  PULMONARY: Breathing comfortably on RA. Breath sounds are equal and clear to auscultation  ABDOMEN: non-distended.   MSK: moves all extremities, no deformities, no peripheral edema, no calf asymmetry  NEURO: Awake, alert, and oriented x 3. Face symmetric. Moves all extremities spontaneously. No focal neurologic deficits  SKIN: Warm, appears well-perfused  MENTAL STATUS: Normal affect      Labs and Imaging  Labs Reviewed   HS TROPONIN I 0HR - Normal       Result Value Ref Range Status    hs TnI 0hr 6  \"Refer to ACS Flowchart\"- see link ng/L Final    Comment:                                              Initial (time 0) result  If >=50 ng/L, Myocardial injury suggested ;  Type of myocardial injury and treatment strategy  to be determined.  If 5-49 ng/L, a delta result at 2 hours will be needed to further evaluate.  If <4 ng/L, and chest pain has been >3 hours since onset, patient may qualify for discharge based on the HEART score in the ED.  If <5 ng/L and <3hours since onset of chest pain, a delta result at 2 hours will be needed to further evaluate.    HS Troponin 99th Percentile URL of a Health Population=12 ng/L with a 95% Confidence Interval of 8-18 ng/L.    Second Troponin (time 2 hours)  If calculated delta >= 20 ng/L,  Myocardial injury suggested ; Type of myocardial injury and treatment strategy to be determined.  If 5-49 ng/L and the calculated delta is 5-19 ng/L, consult medical service for evaluation.  Continue evaluation for ischemia on ecg and other possible etiology and repeat hs troponin at 4 hours.  If delta is <5 ng/L at 2 hours, consider discharge based on risk stratification via the HEART score (if in ED), or DAVID risk score in " IP/Observation.    HS Troponin 99th Percentile URL of a Health Population=12 ng/L with a 95% Confidence Interval of 8-18 ng/L.   CBC AND DIFFERENTIAL    WBC 9.12  4.31 - 10.16 Thousand/uL Final    RBC 4.85  3.88 - 5.62 Million/uL Final    Hemoglobin 15.4  12.0 - 17.0 g/dL Final    Hematocrit 42.9  36.5 - 49.3 % Final    MCV 89  82 - 98 fL Final    MCH 31.8  26.8 - 34.3 pg Final    MCHC 35.9  31.4 - 37.4 g/dL Final    RDW 13.0  11.6 - 15.1 % Final    MPV 9.6  8.9 - 12.7 fL Final    Platelets 283  149 - 390 Thousands/uL Final    nRBC 0  /100 WBCs Final    Segmented % 58  43 - 75 % Final    Immature Grans % 0  0 - 2 % Final    Lymphocytes % 28  14 - 44 % Final    Monocytes % 9  4 - 12 % Final    Eosinophils Relative 4  0 - 6 % Final    Basophils Relative 1  0 - 1 % Final    Absolute Neutrophils 5.21  1.85 - 7.62 Thousands/µL Final    Absolute Immature Grans 0.03  0.00 - 0.20 Thousand/uL Final    Absolute Lymphocytes 2.55  0.60 - 4.47 Thousands/µL Final    Absolute Monocytes 0.86  0.17 - 1.22 Thousand/µL Final    Eosinophils Absolute 0.39  0.00 - 0.61 Thousand/µL Final    Basophils Absolute 0.08  0.00 - 0.10 Thousands/µL Final   COMPREHENSIVE METABOLIC PANEL    Sodium 139  135 - 147 mmol/L Final    Potassium 3.7  3.5 - 5.3 mmol/L Final    Chloride 107  96 - 108 mmol/L Final    CO2 26  21 - 32 mmol/L Final    ANION GAP 6  4 - 13 mmol/L Final    BUN 11  5 - 25 mg/dL Final    Creatinine 0.80  0.60 - 1.30 mg/dL Final    Comment: Standardized to IDMS reference method    Glucose 103  65 - 140 mg/dL Final    Comment: If the patient is fasting, the ADA then defines impaired fasting glucose as > 100 mg/dL and diabetes as > or equal to 123 mg/dL.    Calcium 9.0  8.4 - 10.2 mg/dL Final    AST 17  13 - 39 U/L Final    ALT 11  7 - 52 U/L Final    Comment: Specimen collection should occur prior to Sulfasalazine administration due to the potential for falsely depressed results.     Alkaline Phosphatase 63  34 - 104 U/L Final    Total  Protein 7.1  6.4 - 8.4 g/dL Final    Albumin 4.0  3.5 - 5.0 g/dL Final    Total Bilirubin 0.28  0.20 - 1.00 mg/dL Final    Comment: Use of this assay is not recommended for patients undergoing treatment with eltrombopag due to the potential for falsely elevated results.  N-acetyl-p-benzoquinone imine (metabolite of Acetaminophen) will generate erroneously low results in samples for patients that have taken an overdose of Acetaminophen.    eGFR 101  ml/min/1.73sq m Final    Narrative:     National Kidney Disease Foundation guidelines for Chronic Kidney Disease (CKD):     Stage 1 with normal or high GFR (GFR > 90 mL/min/1.73 square meters)    Stage 2 Mild CKD (GFR = 60-89 mL/min/1.73 square meters)    Stage 3A Moderate CKD (GFR = 45-59 mL/min/1.73 square meters)    Stage 3B Moderate CKD (GFR = 30-44 mL/min/1.73 square meters)    Stage 4 Severe CKD (GFR = 15-29 mL/min/1.73 square meters)    Stage 5 End Stage CKD (GFR <15 mL/min/1.73 square meters)  Note: GFR calculation is accurate only with a steady state creatinine   LIGHT BLUE TOP       No orders to display         Procedures  ECG 12 Lead Documentation Only    Date/Time: 7/9/2025 9:05 AM    Performed by: Gaby Mahoney MD  Authorized by: Gaby Mahoney MD    Comments:      Normal sinus rhythm, ventricular rate 62, NV interval 118, , QTc 422, rightward axis, no ST/T wave changes to suggest ischemia, no STEMI, compared to prior EKG dated 4//23, rightward axis is now present.  Overall, no significant change.          ED Course  Medications - No data to display     55-year-old male presenting after syncopal episode.  Differential diagnosis includes vasovagal event after receiving bad news, dysrhythmia, versus another etiology of symptoms.  Vital signs reviewed, hypertensive, but not tachycardic or hypoxic.  Patient has no chest pain, shortness of breath, or headache.  EKG to my review is nonischemic.  Labs are with baseline renal function, no anemia, normal  troponin x 1.  Patient deemed safe to discharge with recommendations for follow-up with [facility].